# Patient Record
Sex: FEMALE | Race: OTHER | HISPANIC OR LATINO | Employment: FULL TIME | URBAN - METROPOLITAN AREA
[De-identification: names, ages, dates, MRNs, and addresses within clinical notes are randomized per-mention and may not be internally consistent; named-entity substitution may affect disease eponyms.]

---

## 2017-04-12 ENCOUNTER — OFFICE VISIT (OUTPATIENT)
Dept: INTERNAL MEDICINE | Facility: MEDICAL CENTER | Age: 47
End: 2017-04-12
Payer: MEDICAID

## 2017-04-12 VITALS
SYSTOLIC BLOOD PRESSURE: 132 MMHG | HEIGHT: 67 IN | BODY MASS INDEX: 26.84 KG/M2 | HEART RATE: 50 BPM | OXYGEN SATURATION: 98 % | DIASTOLIC BLOOD PRESSURE: 77 MMHG | TEMPERATURE: 98.1 F | WEIGHT: 171 LBS

## 2017-04-12 DIAGNOSIS — F41.9 ANXIETY: ICD-10-CM

## 2017-04-12 DIAGNOSIS — Z72.0 TOBACCO ABUSE: ICD-10-CM

## 2017-04-12 DIAGNOSIS — M79.7 FIBROMYALGIA: ICD-10-CM

## 2017-04-12 DIAGNOSIS — E66.3 OVERWEIGHT (BMI 25.0-29.9): ICD-10-CM

## 2017-04-12 DIAGNOSIS — F33.9 EPISODE OF RECURRENT MAJOR DEPRESSIVE DISORDER, UNSPECIFIED DEPRESSION EPISODE SEVERITY (HCC): ICD-10-CM

## 2017-04-12 DIAGNOSIS — Z23 NEED FOR TDAP VACCINATION: ICD-10-CM

## 2017-04-12 DIAGNOSIS — F31.70 BIPOLAR AFFECTIVE DISORDER IN REMISSION (HCC): ICD-10-CM

## 2017-04-12 DIAGNOSIS — R92.8 ABNORMAL MAMMOGRAM, UNSPECIFIED: ICD-10-CM

## 2017-04-12 DIAGNOSIS — F41.0 PANIC ATTACK: ICD-10-CM

## 2017-04-12 DIAGNOSIS — E28.319 EARLY MENOPAUSE: ICD-10-CM

## 2017-04-12 DIAGNOSIS — M54.42 CHRONIC LEFT-SIDED LOW BACK PAIN WITH LEFT-SIDED SCIATICA: ICD-10-CM

## 2017-04-12 DIAGNOSIS — Z00.00 HEALTHCARE MAINTENANCE: ICD-10-CM

## 2017-04-12 DIAGNOSIS — E55.9 VITAMIN D DEFICIENCY, UNSPECIFIED: ICD-10-CM

## 2017-04-12 DIAGNOSIS — G89.29 OTHER CHRONIC PAIN: ICD-10-CM

## 2017-04-12 DIAGNOSIS — G89.29 CHRONIC LEFT-SIDED LOW BACK PAIN WITH LEFT-SIDED SCIATICA: ICD-10-CM

## 2017-04-12 PROBLEM — F43.10 PTSD (POST-TRAUMATIC STRESS DISORDER): Status: ACTIVE | Noted: 2017-04-12

## 2017-04-12 PROBLEM — M54.40 CHRONIC LEFT-SIDED LOW BACK PAIN WITH SCIATICA: Status: ACTIVE | Noted: 2017-04-12

## 2017-04-12 PROCEDURE — 99204 OFFICE O/P NEW MOD 45 MIN: CPT | Mod: 25,GC | Performed by: INTERNAL MEDICINE

## 2017-04-12 PROCEDURE — 90471 IMMUNIZATION ADMIN: CPT | Performed by: INTERNAL MEDICINE

## 2017-04-12 PROCEDURE — 90715 TDAP VACCINE 7 YRS/> IM: CPT | Performed by: INTERNAL MEDICINE

## 2017-04-12 RX ORDER — CYCLOBENZAPRINE HCL 5 MG
5-10 TABLET ORAL 3 TIMES DAILY PRN
Qty: 30 TAB | Refills: 0 | Status: SHIPPED | OUTPATIENT
Start: 2017-04-12 | End: 2017-05-17 | Stop reason: SDUPTHER

## 2017-04-12 RX ORDER — DULOXETIN HYDROCHLORIDE 30 MG/1
30 CAPSULE, DELAYED RELEASE ORAL DAILY
Qty: 30 CAP | Refills: 5 | Status: SHIPPED | OUTPATIENT
Start: 2017-04-12 | End: 2017-04-12 | Stop reason: SDUPTHER

## 2017-04-12 RX ORDER — DULOXETIN HYDROCHLORIDE 30 MG/1
30 CAPSULE, DELAYED RELEASE ORAL DAILY
Qty: 30 CAP | Refills: 5 | Status: SHIPPED
Start: 2017-04-12 | End: 2017-05-17 | Stop reason: SDUPTHER

## 2017-04-12 ASSESSMENT — PAIN SCALES - GENERAL: PAINLEVEL: 9=SEVERE PAIN

## 2017-04-12 ASSESSMENT — PATIENT HEALTH QUESTIONNAIRE - PHQ9: CLINICAL INTERPRETATION OF PHQ2 SCORE: 3

## 2017-04-12 NOTE — MR AVS SNAPSHOT
"Janina Lantigua   2017 1:30 PM   Office Visit   MRN: [id number]    Department:  [facility] - Internal Med   Dept Phone:  [phone]    Description:  Female : 1970   Provider:  Janis Black M.D.           Reason for Visit     New Patient           Allergies as of 2017     Allergen Noted Reactions    Doxycycline 2017         You were diagnosed with     Fibromyalgia   [925770]       Episode of recurrent major depressive disorder, unspecified depression episode severity (CMS-[facility])   [5157109]       Bipolar affective disorder in remission (CMS-[facility])   [3877291]       Anxiety   [610795]       Panic attack   [206720]       Other chronic pain   [338.29.ICD-9-CM]       Chronic left-sided low back pain with left-sided sciatica   [9698533]       Tobacco abuse   [932739]       Healthcare maintenance   [222845]       Vitamin D deficiency, unspecified   [7608119]       Early menopause   [383335]       Need for Tdap vaccination   [683809]       Abnormal mammogram, unspecified   [793.80.ICD-9-CM]       Overweight (BMI 25.0-29.9)   [962095]         Vital Signs     Blood Pressure Pulse Temperature Height Weight Body Mass Index    132/77 mmHg 50 36.7 °C (98.1 °F) 1.702 m (5' 7\") 77.565 kg (171 lb) 26.78 kg/m2    Oxygen Saturation Last Menstrual Period Breastfeeding? Smoking Status          98% 2008 No Current Every Day Smoker        Basic Information     Date Of Birth Sex Race Ethnicity Preferred Language    1970 Female White Non- English      Your appointments     May 17, 2017  4:00 PM   Established Patient with Janis Black M.D.   [facility] - Internal Medicine (--)    [address] [place] [address]  [address] [place] [address] [id number] [phone]           You will be receiving a confirmation call a few days before your appointment from our automated call confirmation system.              Problem List              ICD-10-CM Priority Class Noted - Resolved    Fibromyalgia M79.7   " 4/12/2017 - Present    Episode of recurrent major depressive disorder (CMS-HCC) F33.9   4/12/2017 - Present    Bipolar affective disorder in remission (CMS-HCC) F31.70   4/12/2017 - Present    Anxiety F41.9   4/12/2017 - Present    Panic attack F41.0   4/12/2017 - Present    Chronic pain G89.29   4/12/2017 - Present    Chronic left-sided low back pain with sciatica M54.40, G89.29   4/12/2017 - Present    Tobacco abuse Z72.0   4/12/2017 - Present    Overweight (BMI 25.0-29.9) E66.3   4/12/2017 - Present      Health Maintenance     Patient has no pending health maintenance at this time      Current Immunizations     Tdap Vaccine 4/12/2017      Below and/or attached are the medications your provider expects you to take. Review all of your home medications and newly ordered medications with your provider and/or pharmacist. Follow medication instructions as directed by your provider and/or pharmacist. Please keep your medication list with you and share with your provider. Update the information when medications are discontinued, doses are changed, or new medications (including over-the-counter products) are added; and carry medication information at all times in the event of emergency situations     Allergies:  DOXYCYCLINE - (reactions not documented)               Medications  Valid as of: April 12, 2017 -  3:14 PM    Generic Name Brand Name Tablet Size Instructions for use    Cyclobenzaprine HCl (Tab) FLEXERIL 5 MG Take 1-2 Tabs by mouth 3 times a day as needed.        DULoxetine HCl (Cap DR Particles) CYMBALTA 30 MG Take 1 Cap by mouth every day.        Nicotine Polacrilex (Gum) NICORETTE 4 MG Chew 1 piece of gum every 1 to 2 hours (maximum: 24 pieces/day); to increase chances of quitting, chew at least 9 pieces/day during the first 6 weeks.        .                 Medicines prescribed today were sent to:     [id number] - [place], [place] - [address]    [address] [place] [address]    Phone:  [phone] Fax: [phone]    Open 24 Hours?: No      Medication refill instructions:       If your prescription bottle indicates you have medication refills left, it is not necessary to call your provider’s office. Please contact your pharmacy and they will refill your medication.    If your prescription bottle indicates you do not have any refills left, you may request refills at any time through one of the following ways: The online [employer] system (except Urgent Care), by calling your provider’s office, or by asking your pharmacy to contact your provider’s office with a refill request. Medication refills are processed only during regular business hours and may not be available until the next business day. Your provider may request additional information or to have a follow-up visit with you prior to refilling your medication.   *Please Note: Medication refills are assigned a new Rx number when refilled electronically. Your pharmacy may indicate that no refills were authorized even though a new prescription for the same medication is available at the pharmacy. Please request the medicine by name with the pharmacy before contacting your provider for a refill.        Your To Do List     Future Labs/Procedures Complete By Expires    CBC WITH DIFFERENTIAL  As directed 4/13/2018    COMP METABOLIC PANEL  As directed 4/13/2018    DS-BONE DENSITY STUDY (DEXA)  As directed 10/13/2017    LIPID PROFILE  As directed 4/13/2018    MA-SCREEN MAMMO W/CAD-BILAT  As directed 5/14/2018    TSH WITH REFLEX TO FT4  As directed 4/12/2018    VITAMIN D,25 HYDROXY  As directed 4/13/2018      Referral     A referral request has been sent to our patient care coordination department. Please allow 3-5 business days for us to process this request and contact you either by phone or mail. If you do not hear from us by the 5th business day, please call us at [phone].        Instructions    - see psychiatrist.  - get blood tests done.  -  get mammogram done.  - get bone scan done.   - next appointment in 5 weeks for pap smear.   - link for [facility] medical marijuana   [email or web address]          MyChart Status: Patient Declined        Quit Tobacco Information     Do you want to quit using tobacco?    Quitting tobacco decreases risks of cancer, heart and lung disease, increases life expectancy, improves sense of taste and smell, and increases spending money, among other benefits.    If you are thinking about quitting, we can help.  • [facility] Quit Tobacco Program: [phone]  o Program occurs weekly for four weeks and includes pharmacist consultation on products to support quitting smoking or chewing tobacco. A provider referral is needed for pharmacist consultation.  • Tobacco Users Help Hotline: 7-325-QUITNOW (515-9174) or https://[place].quitlogix.org/  o Free, confidential telephone and online coaching for [place] residents. Sessions are designed on a schedule that is convenient for you. Eligible clients receive free nicotine replacement therapy.  • Nationally: www.smokefree.gov  o Information and professional assistance to support both immediate and long-term needs as you become, and remain, a non-smoker. Smokefree.gov allows you to choose the help that best fits your needs.

## 2017-04-12 NOTE — PATIENT INSTRUCTIONS
- see psychiatrist.  - get blood tests done.  - get mammogram done.  - get bone scan done.   - next appointment in 5 weeks for pap smear.   - link for [facility] medical marijuana   [email or web address]

## 2017-04-13 ENCOUNTER — TELEPHONE (OUTPATIENT)
Dept: INTERNAL MEDICINE | Facility: MEDICAL CENTER | Age: 47
End: 2017-04-13

## 2017-04-13 NOTE — PROGRESS NOTES
New Patient to Establish    Reason to establish: new patient to establish    CC: medication refill, depression    HPI: Janina is a 46 year old female with history of fibromyalgia, major depression, anxiety, panic attack, anxiety disorder. She visited our office today to establish primary care and medication refill. Patient moved to [place] from [place] about 10 months ago. She states has been out of medications for more than 9 months now. She has been taking Lyrica, Cymbalta, Flexeril and Ibuprofen. Patient states she does not like Lyrica which made her sick and does not help much with the pain. Her pain is mostly in the lower back with left sided sciatica and muscle ache / pain throughout. Denies joint inflammation.     She reports she has been feeling depress & fatigue and her memory was not good as before. But she denies suicidal or homicidal ideation. She states she would never have such kind of thoughts as she has 3 daughters, the youngest one is 9 and all 3 of her daughters need her.     She reports has not had a period since she has her tube ties after she gave birth her last kid about 9 years ago. She noticed hot flushing recently, denies night sweats, labile emotions. Not sexually active currently.    She usually smokes 1/2 PPD for the past 30 years but has not been smoking for a week now because of the delivery of the grand child. She is requesting for nicotine gum today.         Patient Active Problem List    Diagnosis Date Noted   • Fibromyalgia 04/12/2017   • Episode of recurrent major depressive disorder (CMS-HCC) 04/12/2017   • Bipolar affective disorder in remission (CMS-HCC) 04/12/2017   • Anxiety 04/12/2017   • Panic attack 04/12/2017   • Chronic pain 04/12/2017   • Chronic left-sided low back pain with sciatica 04/12/2017   • Tobacco abuse 04/12/2017   • Overweight (BMI 25.0-29.9) 04/12/2017       Past Medical History   Diagnosis Date   • Psychiatric disorder      bipolar   • Psychiatric disorder       depression   • Fibromyalgia    • Major depression    • Asthma    • Bipolar 2 disorder, major depressive episode (CMS-[facility])    • PTSD (post-traumatic stress disorder)    • Lumbago        Current Outpatient Prescriptions   Medication Sig Dispense Refill   • cyclobenzaprine (FLEXERIL) 5 MG tablet Take 1-2 Tabs by mouth 3 times a day as needed. 30 Tab 0   • duloxetine (CYMBALTA) 30 MG Cap DR Particles Take 1 Cap by mouth every day. 30 Cap 5   • nicotine polacrilex (NICORETTE) 4 MG gum Chew 1 piece of gum every 1 to 2 hours (maximum: 24 pieces/day); to increase chances of quitting, chew at least 9 pieces/day during the first 6 weeks. 90 Each 5     No current facility-administered medications for this visit.       Allergies as of 2017 - Shahid as Reviewed 2017   Allergen Reaction Noted   • Doxycycline  2017       Social History     Social History   • Marital Status: Single     Spouse Name: N/A   • Number of Children: N/A   • Years of Education: N/A     Occupational History   • Not on file.     Social History Main Topics   • Smoking status: Current Every Day Smoker -- 0.50 packs/day for 30 years     Types: Cigarettes   • Smokeless tobacco: Never Used   • Alcohol Use: Yes      Comment: 2-3 times a month    • Drug Use: No   • Sexual Activity:     Partners: Male     Other Topics Concern   • Not on file     Social History Narrative       Family History   Problem Relation Age of Onset   • Cancer Mother 43     melanoma,  from cancer spread at age 48.    • Bipolar disorder Father    • Alzheimer's Disease Maternal Grandmother    • Cancer Paternal Grandmother      breast cancer   • Cancer Paternal Grandfather      lung cancer       Past Surgical History   Procedure Laterality Date   • Primary c section       3 times   • Other       ovarian cyst removed by laser surgery       ROS: As per HPI. Additional pertinent symptoms as noted below:     Patient denies chest pain, dyspnea, orthopnea, PND, edema,  "cough, fever, chills, nausea / vomiting, abdominal pain, dysuria, changes in appetite or weight, diarrhea / constipation, headache, tingling / numbness sensation, weakness, visual changes.    /77 mmHg  Pulse 50  Temp(Src) 36.7 °C (98.1 °F)  Ht 1.702 m (5' 7\")  Wt 77.565 kg (171 lb)  BMI 26.78 kg/m2  SpO2 98%  LMP 04/12/2008  Breastfeeding? No    Physical Exam  General:  Alert and oriented, No apparent distress.    Eyes: Pupils equal and reactive. No scleral icterus.    Throat: Clear no erythema or exudates noted.    Neck: Supple. No lymphadenopathy noted. Thyroid not enlarged.    Lungs: Clear to auscultation and percussion bilaterally.    Cardiovascular: Regular rate and rhythm. No murmurs, rubs or gallops.    Abdomen:  Benign. No rebound or guarding noted.    Extremities: No clubbing, cyanosis, edema.    Skin: Clear. No rash or suspicious skin lesions noted.        Assessment and Plan    1. Fibromyalgia      Other chronic pain  - previously followed rheumatologist in [place].   - was on Lyrica, Cymbalta, Flexeril & Ibuprofen.  - has been run out of meds x over 9 months since she moved to [place], has only been taking OTC Ibuprofen.   - reports generalized muscle pain.   PLAN:  -     duloxetine (CYMBALTA) 30 MG Cap DR Particles; Take 1 Cap by mouth every day.  -     TSH WITH REFLEX TO FT4; Future        2. Major depressive disorder      Bipolar affective disorder in remission (CMS-HCC)      Anxiety disorder      Panic attack  - previously followed psych in [place].   - was on Cymbalta.  - reports fatigue, poor concentration, etc. pHQ 9 score 19 - moderate depression.  - denies SI / HI.  PLAN:  -     duloxetine (CYMBALTA) 30 MG Cap DR Particles; Take 1 Cap by mouth every day.        -     REFERRAL TO PSYCHIATRY        3. Chronic left-sided low back pain with left-sided sciatica  - likely secondary to DJD of lumbar.   - + straight leg raising test.   PLAN:   -     duloxetine (CYMBALTA) 30 MG Cap DR " Particles; Take 1 Cap by mouth every day.        -     cyclobenzaprine (FLEXERIL) 5 MG tablet; Take 1-2 Tabs by mouth 3 times a day as needed.        4. Tobacco abuse  - active smoker, reports has not been smoked for a week and request nicotine gum.   PLAN:        -     nicotine polacrilex (NICORETTE) 4 MG gum; Chew 1 piece of gum every 1 to 2 hours (maximum: 24 pieces/day); to increase chances of quitting, chew at least 9 pieces/day during the first 6 weeks.         -     counseled pt on smoking cessation.         5. Vitamin D deficiency, unspecified  -     VITAMIN D,25 HYDROXY; Future        6. Early menopause  - LMP 9 yrs ago since her tube was tied.   - reports flushing symptoms recently.   - she is at risk for osteoporosis given early onset of menopause at age ~ 37 years.   - will screen for osteoporosis with bone scan.   PLAN:  -     DS-BONE DENSITY STUDY (DEXA); Future        7. Abnormal mammogram, unspecified  - pt reports had mammogram in 1/2016 and was told to repeat in a year.   - family hx of breast in 2nd degree relative, paternal grand mother.   - will order mammogram for now.   -     MA-SCREEN MAMMO W/CAD-BILAT; Future        8.Overweight (BMI 25.0-29.9)  - BMI 26.78.  - encouraged life style modification to lost weight.         9. Healthcare maintenance  - will get basic labs.   -     CBC WITH DIFFERENTIAL; Future  -     COMP METABOLIC PANEL; Future  -     LIPID PROFILE; Future  -     TSH WITH REFLEX TO FT4; Future        10. Preventive care  Flu - pt reports up to date.   Tdap - administered in office today.   Pap - next visit.  Mammogram - ordered.  Dexa - ordered.         Followup: Return in about 5 weeks (around 5/17/2017).    Risk Assessment (discuss potential complications a function of chronic problems): discussed with patient.     Complexity (discuss number of co-morbidities): level 5.     Signed by: Janis Black M.D.

## 2017-05-09 ENCOUNTER — HOSPITAL ENCOUNTER (OUTPATIENT)
Dept: LAB | Facility: MEDICAL CENTER | Age: 47
End: 2017-05-09
Attending: STUDENT IN AN ORGANIZED HEALTH CARE EDUCATION/TRAINING PROGRAM
Payer: MEDICAID

## 2017-05-09 DIAGNOSIS — M79.7 FIBROMYALGIA: ICD-10-CM

## 2017-05-09 DIAGNOSIS — F33.9 EPISODE OF RECURRENT MAJOR DEPRESSIVE DISORDER, UNSPECIFIED DEPRESSION EPISODE SEVERITY (HCC): ICD-10-CM

## 2017-05-09 DIAGNOSIS — E55.9 VITAMIN D DEFICIENCY: ICD-10-CM

## 2017-05-09 DIAGNOSIS — Z00.00 HEALTHCARE MAINTENANCE: ICD-10-CM

## 2017-05-09 LAB
25(OH)D3 SERPL-MCNC: 22 NG/ML (ref 30–100)
ALBUMIN SERPL BCP-MCNC: 4.1 G/DL (ref 3.2–4.9)
ALBUMIN/GLOB SERPL: 1.2 G/DL
ALP SERPL-CCNC: 90 U/L (ref 30–99)
ALT SERPL-CCNC: 9 U/L (ref 2–50)
ANION GAP SERPL CALC-SCNC: 5 MMOL/L (ref 0–11.9)
AST SERPL-CCNC: 12 U/L (ref 12–45)
BASOPHILS # BLD AUTO: 1 % (ref 0–1.8)
BASOPHILS # BLD: 0.07 K/UL (ref 0–0.12)
BILIRUB SERPL-MCNC: 0.5 MG/DL (ref 0.1–1.5)
BUN SERPL-MCNC: 18 MG/DL (ref 8–22)
CALCIUM SERPL-MCNC: 9.4 MG/DL (ref 8.5–10.5)
CHLORIDE SERPL-SCNC: 105 MMOL/L (ref 96–112)
CHOLEST SERPL-MCNC: 158 MG/DL (ref 100–199)
CO2 SERPL-SCNC: 29 MMOL/L (ref 20–33)
CREAT SERPL-MCNC: 0.87 MG/DL (ref 0.5–1.4)
EOSINOPHIL # BLD AUTO: 0.58 K/UL (ref 0–0.51)
EOSINOPHIL NFR BLD: 8.3 % (ref 0–6.9)
ERYTHROCYTE [DISTWIDTH] IN BLOOD BY AUTOMATED COUNT: 45.4 FL (ref 35.9–50)
GFR SERPL CREATININE-BSD FRML MDRD: >60 ML/MIN/1.73 M 2
GLOBULIN SER CALC-MCNC: 3.4 G/DL (ref 1.9–3.5)
GLUCOSE SERPL-MCNC: 93 MG/DL (ref 65–99)
HCT VFR BLD AUTO: 43.8 % (ref 37–47)
HDLC SERPL-MCNC: 48 MG/DL
HGB BLD-MCNC: 14 G/DL (ref 12–16)
IMM GRANULOCYTES # BLD AUTO: 0.02 K/UL (ref 0–0.11)
IMM GRANULOCYTES NFR BLD AUTO: 0.3 % (ref 0–0.9)
LDLC SERPL CALC-MCNC: 93 MG/DL
LYMPHOCYTES # BLD AUTO: 2.12 K/UL (ref 1–4.8)
LYMPHOCYTES NFR BLD: 30.4 % (ref 22–41)
MCH RBC QN AUTO: 30.4 PG (ref 27–33)
MCHC RBC AUTO-ENTMCNC: 32 G/DL (ref 33.6–35)
MCV RBC AUTO: 95 FL (ref 81.4–97.8)
MONOCYTES # BLD AUTO: 0.61 K/UL (ref 0–0.85)
MONOCYTES NFR BLD AUTO: 8.7 % (ref 0–13.4)
NEUTROPHILS # BLD AUTO: 3.58 K/UL (ref 2–7.15)
NEUTROPHILS NFR BLD: 51.3 % (ref 44–72)
NRBC # BLD AUTO: 0 K/UL
NRBC BLD AUTO-RTO: 0 /100 WBC
PLATELET # BLD AUTO: 349 K/UL (ref 164–446)
PMV BLD AUTO: 9.1 FL (ref 9–12.9)
POTASSIUM SERPL-SCNC: 4.1 MMOL/L (ref 3.6–5.5)
PROT SERPL-MCNC: 7.5 G/DL (ref 6–8.2)
RBC # BLD AUTO: 4.61 M/UL (ref 4.2–5.4)
SODIUM SERPL-SCNC: 139 MMOL/L (ref 135–145)
TRIGL SERPL-MCNC: 86 MG/DL (ref 0–149)
TSH SERPL DL<=0.005 MIU/L-ACNC: 0.97 UIU/ML (ref 0.3–3.7)
WBC # BLD AUTO: 7 K/UL (ref 4.8–10.8)

## 2017-05-09 PROCEDURE — 80061 LIPID PANEL: CPT

## 2017-05-09 PROCEDURE — 36415 COLL VENOUS BLD VENIPUNCTURE: CPT

## 2017-05-09 PROCEDURE — 82306 VITAMIN D 25 HYDROXY: CPT

## 2017-05-09 PROCEDURE — 80053 COMPREHEN METABOLIC PANEL: CPT

## 2017-05-09 PROCEDURE — 85025 COMPLETE CBC W/AUTO DIFF WBC: CPT

## 2017-05-09 PROCEDURE — 84443 ASSAY THYROID STIM HORMONE: CPT

## 2017-05-11 ENCOUNTER — TELEPHONE (OUTPATIENT)
Dept: INTERNAL MEDICINE | Facility: MEDICAL CENTER | Age: 47
End: 2017-05-11

## 2017-05-11 NOTE — Clinical Note
May 12, 2017        Janina Lantigua  [address] [facility] [address]        Dear Dr. Sofia Roa has reviewed your lab test(s) collected on 05/09/17. Results are as follows:  Recent blood tests are all within normal except for slightly low vitamin D. You  should take over the counter vitamin D supplementation: vit D 1000 IU or 2000 IU daily. Thanks !                     If you have any questions or concerns, please don't hesitate to call.        Sincerely,        Janis Black M.D.  Jamila Jessica MA    Electronically Signed

## 2017-05-11 NOTE — TELEPHONE ENCOUNTER
Please call patient and let her know that her recent blood tests are all within normal except for slightly low vitamin D. She should take over the counter vitamin D supplementation: vit D 1000 IU or 2000 IU daily. Thanks !

## 2017-05-17 ENCOUNTER — OFFICE VISIT (OUTPATIENT)
Dept: INTERNAL MEDICINE | Facility: MEDICAL CENTER | Age: 47
End: 2017-05-17
Payer: MEDICAID

## 2017-05-17 VITALS
WEIGHT: 168 LBS | SYSTOLIC BLOOD PRESSURE: 112 MMHG | OXYGEN SATURATION: 95 % | HEART RATE: 67 BPM | DIASTOLIC BLOOD PRESSURE: 75 MMHG | TEMPERATURE: 98.1 F | HEIGHT: 67 IN | BODY MASS INDEX: 26.37 KG/M2

## 2017-05-17 DIAGNOSIS — G89.29 CHRONIC LEFT-SIDED LOW BACK PAIN WITH LEFT-SIDED SCIATICA: ICD-10-CM

## 2017-05-17 DIAGNOSIS — G56.02 CARPAL TUNNEL SYNDROME OF LEFT WRIST: ICD-10-CM

## 2017-05-17 DIAGNOSIS — E66.3 OVERWEIGHT (BMI 25.0-29.9): ICD-10-CM

## 2017-05-17 DIAGNOSIS — Z72.0 TOBACCO ABUSE: ICD-10-CM

## 2017-05-17 DIAGNOSIS — M25.562 CHRONIC PAIN OF LEFT KNEE: ICD-10-CM

## 2017-05-17 DIAGNOSIS — M54.42 CHRONIC LEFT-SIDED LOW BACK PAIN WITH LEFT-SIDED SCIATICA: ICD-10-CM

## 2017-05-17 DIAGNOSIS — F41.0 PANIC ATTACK: ICD-10-CM

## 2017-05-17 DIAGNOSIS — M79.7 FIBROMYALGIA: ICD-10-CM

## 2017-05-17 DIAGNOSIS — F31.70 BIPOLAR AFFECTIVE DISORDER IN REMISSION (HCC): ICD-10-CM

## 2017-05-17 DIAGNOSIS — F33.9 EPISODE OF RECURRENT MAJOR DEPRESSIVE DISORDER, UNSPECIFIED DEPRESSION EPISODE SEVERITY (HCC): ICD-10-CM

## 2017-05-17 DIAGNOSIS — M25.552 LEFT HIP PAIN: ICD-10-CM

## 2017-05-17 DIAGNOSIS — F41.9 ANXIETY: ICD-10-CM

## 2017-05-17 DIAGNOSIS — G89.29 CHRONIC PAIN OF LEFT KNEE: ICD-10-CM

## 2017-05-17 DIAGNOSIS — G89.29 OTHER CHRONIC PAIN: ICD-10-CM

## 2017-05-17 PROCEDURE — 99214 OFFICE O/P EST MOD 30 MIN: CPT | Mod: GC | Performed by: INTERNAL MEDICINE

## 2017-05-17 RX ORDER — DULOXETIN HYDROCHLORIDE 30 MG/1
30 CAPSULE, DELAYED RELEASE ORAL DAILY
Qty: 30 CAP | Refills: 5 | Status: SHIPPED | OUTPATIENT
Start: 2017-05-17 | End: 2019-08-18

## 2017-05-17 RX ORDER — CYCLOBENZAPRINE HCL 5 MG
5-10 TABLET ORAL 3 TIMES DAILY PRN
Qty: 30 TAB | Refills: 3 | Status: SHIPPED | OUTPATIENT
Start: 2017-05-17 | End: 2019-08-18

## 2017-05-17 ASSESSMENT — PAIN SCALES - GENERAL: PAINLEVEL: 6=MODERATE PAIN

## 2017-05-17 NOTE — PATIENT INSTRUCTIONS
- get your imaging done.   - next appointment in 5 weeks with Dr. Black for pap smear and women wellness examination.

## 2017-05-17 NOTE — PROGRESS NOTES
Established Patient    Janina presents today with the following:    CC: follow up; back pain; left sided hip pain; left knee pain     HPI: Janina is a 46 year old female with history of fibromyalgia, major depression, anxiety, panic attack, anxiety disorder. She visited our office today for above cc. Patient reports that lately, her back pain, left hip pain and left knee pain has been bothering her much. Cymbalta & Flexeril helps with the pain somewhat degree but she is still hurting most of the time. During her last office visit, she was interested in medical marijuana. Today, she states that she can not afford the medical marijuana application fees $25, as she has no income.     She also reports numbness of left hand ring finger & little finger. She takes care of new born grand-daughter at home and has been carrying her most of the time.     She has a recent routine blood tests with normal results except for mildly reduced vitamin d level.     She reports she has recently stopped smoking.     Patient Active Problem List    Diagnosis Date Noted   • Carpal tunnel syndrome of left wrist 05/17/2017   • Fibromyalgia 04/12/2017   • Episode of recurrent major depressive disorder (CMS-HCC) 04/12/2017   • Bipolar affective disorder in remission (CMS-HCC) 04/12/2017   • Anxiety 04/12/2017   • Panic attack 04/12/2017   • Chronic pain 04/12/2017   • Chronic left-sided low back pain with sciatica 04/12/2017   • Tobacco abuse 04/12/2017   • Overweight (BMI 25.0-29.9) 04/12/2017       Current Outpatient Prescriptions   Medication Sig Dispense Refill   • cyclobenzaprine (FLEXERIL) 5 MG tablet Take 1-2 Tabs by mouth 3 times a day as needed. 30 Tab 3   • duloxetine (CYMBALTA) 30 MG Cap DR Particles Take 1 Cap by mouth every day. 30 Cap 5   • nicotine polacrilex (NICORETTE) 4 MG gum Chew 1 piece of gum every 1 to 2 hours (maximum: 24 pieces/day); to increase chances of quitting, chew at least 9 pieces/day during the first 6 weeks.  "90 Each 5     No current facility-administered medications for this visit.       Social History     Social History   • Marital Status: Single     Spouse Name: N/A   • Number of Children: N/A   • Years of Education: N/A     Occupational History   • Not on file.     Social History Main Topics   • Smoking status: Former Smoker -- 0.50 packs/day for 30 years     Types: Cigarettes   • Smokeless tobacco: Never Used   • Alcohol Use: Yes      Comment: 2-3 times a month    • Drug Use: No   • Sexual Activity:     Partners: Male     Other Topics Concern   • Not on file     Social History Narrative       Family History   Problem Relation Age of Onset   • Cancer Mother 43     melanoma,  from cancer spread at age 48.    • Bipolar disorder Father    • Alzheimer's Disease Maternal Grandmother    • Cancer Paternal Grandmother      breast cancer   • Cancer Paternal Grandfather      lung cancer       ROS: As per HPI. Additional pertinent symptoms as noted below:    Patient denies chest pain, dyspnea, orthopnea, PND, edema, cough, fever, chills, nausea / vomiting, abdominal pain, dysuria, changes in appetite or weight, diarrhea / constipation, headache, tingling / numbness sensation, weakness, visual changes.    /75 mmHg  Pulse 67  Temp(Src) 36.7 °C (98.1 °F)  Ht 1.702 m (5' 7.01\")  Wt 76.204 kg (168 lb)  BMI 26.31 kg/m2  SpO2 95%  Breastfeeding? No    Physical Exam  General:  Alert and oriented, No apparent distress.    Eyes: Pupils equal and reactive. No scleral icterus.    Throat: Clear no erythema or exudates noted.    Neck: Supple. No lymphadenopathy noted. Thyroid not enlarged.    Lungs: Clear to auscultation and percussion bilaterally.    Cardiovascular: Regular rate and rhythm. No murmurs, rubs or gallops.    Abdomen:  Benign. No rebound or guarding noted.    Extremities: No clubbing, cyanosis, edema.    Skin: Clear. No rash or suspicious skin lesions noted.    Musculoskeletal: tender to touch over lumbar " vertebrae & paraspinal muscles, but no spasm of muscle noted. + left sided straight leg raising test.                               Tender point + over left lateral hip.                               Limited ROM of left knee & lumbar because of pain.       Assessment and Plan    1. Chronic left-sided low back pain with left-sided sciatica      Left hip pain      Chronic pain of left knee  - secondary to ? DJD vs fibromyalgia.  - will get lumbar spine XR, pelvic XR including B/L hip, knee XR.   - encourage to use heat pad.   - continue cymbalta & flexeril.      Cymbalta 30 mg qd.      Flexeril 5 mg, 1-2 tabs tid prn.   - encourage weight loss by life style modification.         2. Carpal tunnel syndrome of left wrist  - positive Phalen's maneuver.   - courage to wear wrist splint.         3. Fibromyalgia      Other chronic pain  - previously followed rheumatologist in [place].   - was on Lyrica, Cymbalta, Flexeril & Ibuprofen.   - off meds x over 9 months since she moved to [place] in 2016, Till she visited our office in  April 2017, she started taking Cymbalta.  - now on Cymbalta.  - continue Cymbalta 30 mg qd.         4. Major depressive disorder       Bipolar affective disorder in remission (CMS-HCC)       Anxiety disorder       Panic attack  - previously followed psych in  [place].   - was on Cymbalta.   - reports fatigue, poor concentration, etc. pHQ 9 score 19 - moderate depression.   - denies SI / HI.   - continues Cymbalta.  - pending psych eval, she has appointment in 5/30/17.         5. Overweight (BMI 25.0-29.9)  - BMI 26.31.  - encourage life style modification and regular exercise.         6. Tobacco abuse  - pt states has stopped smoking a few weeks ago.  - does not like nicotine gum.         7. Vitamin D deficiency  - 25 OH vit D 22 in 5/9/2017.   - OTC vit D supplements.         8. Preventive care  Flu - completed   TDaP - last one in 4/2017.   Pap - next visit   Mammogram - has appointment in  6/9/2017.   Dexa - has appointment in 6/9/2017.       Followup: Return in about 5 weeks (around 6/21/2017).     Signed by: Janis Black M.D.

## 2017-05-17 NOTE — MR AVS SNAPSHOT
"        Janina Lantigua   2017 4:00 PM   Office Visit   MRN: [id number]    Department:  [facility]   Dept Phone:  [phone]    Description:  Female : 1970   Provider:  Janis Black M.D.           Reason for Visit     Follow-Up           Allergies as of 2017     Allergen Noted Reactions    Doxycycline 2017         You were diagnosed with     Chronic left-sided low back pain with left-sided sciatica   [6137787]       Fibromyalgia   [382213]       Other chronic pain   [338.29.ICD-9-CM]       Left hip pain   [868710]       Tobacco abuse   [255942]       Panic attack   [722649]       Overweight (BMI 25.0-29.9)   [443275]       Episode of recurrent major depressive disorder, unspecified depression episode severity (CMS-HCC)   [2523744]       Anxiety   [826298]       Bipolar affective disorder in remission (CMS-[facility])   [5194157]       Chronic pain of left knee   [541542]         Vital Signs     Blood Pressure Pulse Temperature Height Weight Body Mass Index    112/75 mmHg 67 36.7 °C (98.1 °F) 1.702 m (5' 7.01\") 76.204 kg (168 lb) 26.31 kg/m2    Oxygen Saturation Breastfeeding? Smoking Status             95% No Former Smoker         Basic Information     Date Of Birth Sex Race Ethnicity Preferred Language    1970 Female White Non- English      Your appointments     2017  1:00 PM   MA SCRN10 with RB[facility] MG 2   [facility] [place], [facility] ([address])    [facility] [address] [address] [phone]           No deodorant, powder, perfume or lotion under the arm or breast area.            2017  1:30 PM   BONE DENSITY (DEXASCAN) with RBHC BD 1   [facility] [place], [facility] ([address])    [address] [facility] [address]  [facility] [id number] [phone]           No calcium supplements 24 hours prior to exam, including antacids or multivitamins w/calcium.  Pt may eat and drink normally.  No injection procedures prior to Dexa on the same " day.  No barium contrast, no CTs with IV or oral contrast, no Pet/CTs and no Nuc Med injections for 7 days prior to a Dexa (including Barium Swallow, Upper GI, Small Bowel Series).  If scheduling a Dexa on the same day as a CT with IV or oral contrast, any test with barium, Pet/CT or a Nuc Med with injection, always schedule the Dexa before the other study.            Jun 21, 2017  2:15 PM   Established Patient with Janis Black M.D.   [facility] - Internal Medicine (--)    [address] [place] [address]  [address]  [facility] [address] [phone]           You will be receiving a confirmation call a few days before your appointment from our automated call confirmation system.              Problem List              ICD-10-CM Priority Class Noted - Resolved    Fibromyalgia M79.7   4/12/2017 - Present    Episode of recurrent major depressive disorder (CMS-HCC) F33.9   4/12/2017 - Present    Bipolar affective disorder in remission (CMS-[facility]) F31.70   4/12/2017 - Present    Anxiety F41.9   4/12/2017 - Present    Panic attack F41.0   4/12/2017 - Present    Chronic pain G89.29   4/12/2017 - Present    Chronic left-sided low back pain with sciatica M54.40, G89.29   4/12/2017 - Present    Tobacco abuse Z72.0   4/12/2017 - Present    Overweight (BMI 25.0-29.9) E66.3   4/12/2017 - Present      Health Maintenance        Date Due Completion Dates    IMM PNEUMOCOCCAL 19-64 (ADULT) MEDIUM RISK SERIES (1 of 1 - PPSV23) 10/19/1991 ---    PAP SMEAR 10/19/1993 ---    MAMMOGRAM 10/19/2012 ---    IMM DTaP/Tdap/Td Vaccine (2 - Td) 4/12/2027 4/12/2017            Current Immunizations     Tdap Vaccine 4/12/2017      Below and/or attached are the medications your provider expects you to take. Review all of your home medications and newly ordered medications with your provider and/or pharmacist. Follow medication instructions as directed by your provider and/or pharmacist. Please keep your medication list with you and share with your  provider. Update the information when medications are discontinued, doses are changed, or new medications (including over-the-counter products) are added; and carry medication information at all times in the event of emergency situations     Allergies:  DOXYCYCLINE - (reactions not documented)               Medications  Valid as of: May 17, 2017 -  5:00 PM    Generic Name Brand Name Tablet Size Instructions for use    Cyclobenzaprine HCl (Tab) FLEXERIL 5 MG Take 1-2 Tabs by mouth 3 times a day as needed.        DULoxetine HCl (Cap DR Particles) CYMBALTA 30 MG Take 1 Cap by mouth every day.        Nicotine Polacrilex (Gum) NICORETTE 4 MG Chew 1 piece of gum every 1 to 2 hours (maximum: 24 pieces/day); to increase chances of quitting, chew at least 9 pieces/day during the first 6 weeks.        .                 Medicines prescribed today were sent to:     [id number] - [place], [place] - [address]    [address] [place] [address]    Phone: [phone] Fax: [phone]    Open 24 Hours?: No      Medication refill instructions:       If your prescription bottle indicates you have medication refills left, it is not necessary to call your provider’s office. Please contact your pharmacy and they will refill your medication.    If your prescription bottle indicates you do not have any refills left, you may request refills at any time through one of the following ways: The online [facility] system (except Urgent Care), by calling your provider’s office, or by asking your pharmacy to contact your provider’s office with a refill request. Medication refills are processed only during regular business hours and may not be available until the next business day. Your provider may request additional information or to have a follow-up visit with you prior to refilling your medication.   *Please Note: Medication refills are assigned a new Rx number when refilled electronically. Your pharmacy may indicate that no refills  were authorized even though a new prescription for the same medication is available at the pharmacy. Please request the medicine by name with the pharmacy before contacting your provider for a refill.        Your To Do List     Future Labs/Procedures Complete By Expires    DX-HIP-BILATERAL-WITH PELVIS-3/4 VIEWS  As directed 5/17/2018    DX-KNEE 2- LEFT  As directed 5/17/2018    DX-LUMBAR SPINE-2 OR 3 VIEWS  As directed 5/17/2018      Instructions    - get your imaging done.   - next appointment in 5 weeks with Dr. Black for pap smear and women wellness examination.             MyChart Status: Patient Declined

## 2017-06-09 ENCOUNTER — HOSPITAL ENCOUNTER (OUTPATIENT)
Dept: RADIOLOGY | Facility: MEDICAL CENTER | Age: 47
End: 2017-06-09
Attending: STUDENT IN AN ORGANIZED HEALTH CARE EDUCATION/TRAINING PROGRAM
Payer: MEDICAID

## 2017-06-09 ENCOUNTER — TELEPHONE (OUTPATIENT)
Dept: HOSPITALIST | Facility: MEDICAL CENTER | Age: 47
End: 2017-06-09

## 2017-06-09 DIAGNOSIS — E28.319 EARLY MENOPAUSE: ICD-10-CM

## 2017-06-09 DIAGNOSIS — R92.8 ABNORMAL MAMMOGRAM, UNSPECIFIED: ICD-10-CM

## 2017-06-09 PROCEDURE — G0202 SCR MAMMO BI INCL CAD: HCPCS

## 2017-06-09 PROCEDURE — 77080 DXA BONE DENSITY AXIAL: CPT

## 2017-06-20 ENCOUNTER — HOSPITAL ENCOUNTER (OUTPATIENT)
Dept: RADIOLOGY | Facility: MEDICAL CENTER | Age: 47
End: 2017-06-20

## 2017-06-21 ENCOUNTER — APPOINTMENT (OUTPATIENT)
Dept: INTERNAL MEDICINE | Facility: MEDICAL CENTER | Age: 47
End: 2017-06-21
Payer: MEDICAID

## 2017-09-25 ENCOUNTER — APPOINTMENT (OUTPATIENT)
Dept: INTERNAL MEDICINE | Facility: MEDICAL CENTER | Age: 47
End: 2017-09-25
Payer: MEDICAID

## 2019-08-18 ENCOUNTER — OFFICE VISIT (OUTPATIENT)
Dept: URGENT CARE | Facility: CLINIC | Age: 49
End: 2019-08-18
Payer: MEDICAID

## 2019-08-18 ENCOUNTER — HOSPITAL ENCOUNTER (EMERGENCY)
Facility: MEDICAL CENTER | Age: 49
End: 2019-08-18
Attending: EMERGENCY MEDICINE
Payer: MEDICAID

## 2019-08-18 VITALS
BODY MASS INDEX: 22.46 KG/M2 | OXYGEN SATURATION: 94 % | WEIGHT: 147.71 LBS | TEMPERATURE: 96.8 F | RESPIRATION RATE: 18 BRPM | DIASTOLIC BLOOD PRESSURE: 67 MMHG | HEART RATE: 50 BPM | SYSTOLIC BLOOD PRESSURE: 111 MMHG

## 2019-08-18 VITALS
WEIGHT: 147.6 LBS | OXYGEN SATURATION: 97 % | SYSTOLIC BLOOD PRESSURE: 102 MMHG | BODY MASS INDEX: 22.37 KG/M2 | HEIGHT: 68 IN | DIASTOLIC BLOOD PRESSURE: 70 MMHG | TEMPERATURE: 98.4 F | RESPIRATION RATE: 12 BRPM | HEART RATE: 71 BPM

## 2019-08-18 DIAGNOSIS — N12 PYELONEPHRITIS: ICD-10-CM

## 2019-08-18 DIAGNOSIS — R10.32 ACUTE LEFT LOWER QUADRANT PAIN: ICD-10-CM

## 2019-08-18 LAB
ALBUMIN SERPL BCP-MCNC: 4.3 G/DL (ref 3.2–4.9)
ALBUMIN/GLOB SERPL: 1.3 G/DL
ALP SERPL-CCNC: 61 U/L (ref 30–99)
ALT SERPL-CCNC: 10 U/L (ref 2–50)
ANION GAP SERPL CALC-SCNC: 6 MMOL/L (ref 0–11.9)
APPEARANCE UR: ABNORMAL
AST SERPL-CCNC: 17 U/L (ref 12–45)
BACTERIA #/AREA URNS HPF: ABNORMAL /HPF
BASOPHILS # BLD AUTO: 0.7 % (ref 0–1.8)
BASOPHILS # BLD: 0.05 K/UL (ref 0–0.12)
BILIRUB SERPL-MCNC: 0.5 MG/DL (ref 0.1–1.5)
BILIRUB UR QL STRIP.AUTO: NEGATIVE
BUN SERPL-MCNC: 13 MG/DL (ref 8–22)
CALCIUM SERPL-MCNC: 9.6 MG/DL (ref 8.5–10.5)
CHLORIDE SERPL-SCNC: 106 MMOL/L (ref 96–112)
CO2 SERPL-SCNC: 27 MMOL/L (ref 20–33)
COLOR UR: YELLOW
CREAT SERPL-MCNC: 0.92 MG/DL (ref 0.5–1.4)
EOSINOPHIL # BLD AUTO: 0.25 K/UL (ref 0–0.51)
EOSINOPHIL NFR BLD: 3.3 % (ref 0–6.9)
EPI CELLS #/AREA URNS HPF: ABNORMAL /HPF
ERYTHROCYTE [DISTWIDTH] IN BLOOD BY AUTOMATED COUNT: 46.3 FL (ref 35.9–50)
GLOBULIN SER CALC-MCNC: 3.2 G/DL (ref 1.9–3.5)
GLUCOSE SERPL-MCNC: 95 MG/DL (ref 65–99)
GLUCOSE UR STRIP.AUTO-MCNC: NEGATIVE MG/DL
HCG SERPL QL: NEGATIVE
HCG UR QL: NEGATIVE
HCT VFR BLD AUTO: 44.5 % (ref 37–47)
HGB BLD-MCNC: 14.3 G/DL (ref 12–16)
IMM GRANULOCYTES # BLD AUTO: 0.06 K/UL (ref 0–0.11)
IMM GRANULOCYTES NFR BLD AUTO: 0.8 % (ref 0–0.9)
KETONES UR STRIP.AUTO-MCNC: NEGATIVE MG/DL
LEUKOCYTE ESTERASE UR QL STRIP.AUTO: ABNORMAL
LIPASE SERPL-CCNC: 19 U/L (ref 11–82)
LYMPHOCYTES # BLD AUTO: 3.41 K/UL (ref 1–4.8)
LYMPHOCYTES NFR BLD: 45.1 % (ref 22–41)
MCH RBC QN AUTO: 31 PG (ref 27–33)
MCHC RBC AUTO-ENTMCNC: 32.1 G/DL (ref 33.6–35)
MCV RBC AUTO: 96.3 FL (ref 81.4–97.8)
MICRO URNS: ABNORMAL
MONOCYTES # BLD AUTO: 0.54 K/UL (ref 0–0.85)
MONOCYTES NFR BLD AUTO: 7.1 % (ref 0–13.4)
NEUTROPHILS # BLD AUTO: 3.25 K/UL (ref 2–7.15)
NEUTROPHILS NFR BLD: 43 % (ref 44–72)
NITRITE UR QL STRIP.AUTO: NEGATIVE
NRBC # BLD AUTO: 0 K/UL
NRBC BLD-RTO: 0 /100 WBC
PH UR STRIP.AUTO: 8 [PH] (ref 5–8)
PLATELET # BLD AUTO: 264 K/UL (ref 164–446)
PMV BLD AUTO: 8.6 FL (ref 9–12.9)
POTASSIUM SERPL-SCNC: 3.9 MMOL/L (ref 3.6–5.5)
PROT SERPL-MCNC: 7.5 G/DL (ref 6–8.2)
PROT UR QL STRIP: NEGATIVE MG/DL
RBC # BLD AUTO: 4.62 M/UL (ref 4.2–5.4)
RBC # URNS HPF: ABNORMAL /HPF
RBC UR QL AUTO: NEGATIVE
SODIUM SERPL-SCNC: 139 MMOL/L (ref 135–145)
SP GR UR STRIP.AUTO: 1.02
UROBILINOGEN UR STRIP.AUTO-MCNC: 1 MG/DL
WBC # BLD AUTO: 7.6 K/UL (ref 4.8–10.8)
WBC #/AREA URNS HPF: ABNORMAL /HPF

## 2019-08-18 PROCEDURE — 99285 EMERGENCY DEPT VISIT HI MDM: CPT

## 2019-08-18 PROCEDURE — 81025 URINE PREGNANCY TEST: CPT

## 2019-08-18 PROCEDURE — 80053 COMPREHEN METABOLIC PANEL: CPT

## 2019-08-18 PROCEDURE — 700111 HCHG RX REV CODE 636 W/ 250 OVERRIDE (IP): Performed by: EMERGENCY MEDICINE

## 2019-08-18 PROCEDURE — 96375 TX/PRO/DX INJ NEW DRUG ADDON: CPT

## 2019-08-18 PROCEDURE — 96365 THER/PROPH/DIAG IV INF INIT: CPT

## 2019-08-18 PROCEDURE — 81001 URINALYSIS AUTO W/SCOPE: CPT

## 2019-08-18 PROCEDURE — 96376 TX/PRO/DX INJ SAME DRUG ADON: CPT

## 2019-08-18 PROCEDURE — 83690 ASSAY OF LIPASE: CPT

## 2019-08-18 PROCEDURE — 700105 HCHG RX REV CODE 258: Performed by: EMERGENCY MEDICINE

## 2019-08-18 PROCEDURE — 85025 COMPLETE CBC W/AUTO DIFF WBC: CPT

## 2019-08-18 PROCEDURE — 87086 URINE CULTURE/COLONY COUNT: CPT

## 2019-08-18 PROCEDURE — 84703 CHORIONIC GONADOTROPIN ASSAY: CPT

## 2019-08-18 PROCEDURE — 99204 OFFICE O/P NEW MOD 45 MIN: CPT | Performed by: NURSE PRACTITIONER

## 2019-08-18 RX ORDER — MELOXICAM 7.5 MG/1
7.5 TABLET ORAL 2 TIMES DAILY PRN
Refills: 0 | COMMUNITY
Start: 2019-06-16 | End: 2019-08-18

## 2019-08-18 RX ORDER — METHOCARBAMOL 500 MG/1
500 TABLET, FILM COATED ORAL 3 TIMES DAILY
Refills: 0 | COMMUNITY
Start: 2019-06-07 | End: 2019-08-18

## 2019-08-18 RX ORDER — ONDANSETRON 4 MG/1
4 TABLET, ORALLY DISINTEGRATING ORAL EVERY 8 HOURS PRN
Qty: 10 TAB | Refills: 0 | Status: SHIPPED | OUTPATIENT
Start: 2019-08-18 | End: 2019-09-03

## 2019-08-18 RX ORDER — OXYCODONE HYDROCHLORIDE AND ACETAMINOPHEN 5; 325 MG/1; MG/1
1-2 TABLET ORAL EVERY 6 HOURS PRN
Qty: 10 TAB | Refills: 0 | Status: ON HOLD | OUTPATIENT
Start: 2019-08-18 | End: 2019-08-25

## 2019-08-18 RX ORDER — IBUPROFEN 200 MG
600 TABLET ORAL EVERY 6 HOURS PRN
COMMUNITY
End: 2019-08-20

## 2019-08-18 RX ORDER — MORPHINE SULFATE 4 MG/ML
4 INJECTION, SOLUTION INTRAMUSCULAR; INTRAVENOUS ONCE
Status: COMPLETED | OUTPATIENT
Start: 2019-08-18 | End: 2019-08-18

## 2019-08-18 RX ORDER — ONDANSETRON 2 MG/ML
4 INJECTION INTRAMUSCULAR; INTRAVENOUS
Status: COMPLETED | OUTPATIENT
Start: 2019-08-18 | End: 2019-08-18

## 2019-08-18 RX ORDER — CEFDINIR 300 MG/1
300 CAPSULE ORAL 2 TIMES DAILY
Qty: 14 CAP | Refills: 0 | Status: SHIPPED | OUTPATIENT
Start: 2019-08-18 | End: 2023-01-03

## 2019-08-18 RX ADMIN — MORPHINE SULFATE 4 MG: 4 INJECTION INTRAVENOUS at 16:47

## 2019-08-18 RX ADMIN — ONDANSETRON 4 MG: 2 INJECTION INTRAMUSCULAR; INTRAVENOUS at 16:47

## 2019-08-18 RX ADMIN — MORPHINE SULFATE 4 MG: 4 INJECTION INTRAVENOUS at 15:54

## 2019-08-18 RX ADMIN — ONDANSETRON 4 MG: 2 INJECTION INTRAMUSCULAR; INTRAVENOUS at 15:54

## 2019-08-18 RX ADMIN — CEFTRIAXONE SODIUM 2 G: 2 INJECTION, POWDER, FOR SOLUTION INTRAMUSCULAR; INTRAVENOUS at 17:19

## 2019-08-18 SDOH — HEALTH STABILITY: MENTAL HEALTH: HOW OFTEN DO YOU HAVE A DRINK CONTAINING ALCOHOL?: NEVER

## 2019-08-18 NOTE — ED TRIAGE NOTES
Pt to triage .  Chief Complaint   Patient presents with   • LLQ Pain     x 3 days   • Sent from Urgent Care     for further evaluation    • Nausea   • Diarrhea

## 2019-08-18 NOTE — ED PROVIDER NOTES
CHIEF COMPLAINT  Chief Complaint   Patient presents with   • LLQ Pain     x 3 days   • Sent from Urgent Care     for further evaluation    • Nausea   • Diarrhea       HPI  Janina Lantigua is a 48 y.o. female who presents with 3 days of left lower quadrant abdominal pain.  Pain is constant and moderately severe and worse with eating pain is located in the left abdomen.  History of prior remote diverticulitis.  She cannot remember if this is similar.  No flank pain.  No nausea or vomiting.  She did have some diarrhea this week.  No bloody diarrhea no recent antibiotic use.  No diabetes or immune compromise.  No gastritis peptic ulcer disease or pancreatitis.  Past surgical history limited to 3  sections.    REVIEW OF SYSTEMS  Pertinent positives include: Left-sided abdominal pain.  Dysuria.  Pertinent negatives include: Fever, cough, sore throat, rash, swelling, urgency, frequency.  10+ systems reviewed and negative.      PAST MEDICAL HISTORY  Past Medical History:   Diagnosis Date   • Asthma    • Bipolar 2 disorder, major depressive episode (HCC)    • Fibromyalgia    • Lumbago    • Major depression    • Psychiatric disorder     bipolar   • Psychiatric disorder     depression   • PTSD (post-traumatic stress disorder)        FAMILY HISTORY  Family History   Problem Relation Age of Onset   • Cancer Mother 43        melanoma,  from cancer spread at age 48.    • Bipolar disorder Father    • Alzheimer's Disease Maternal Grandmother    • Cancer Paternal Grandmother         breast cancer   • Cancer Paternal Grandfather         lung cancer       SOCIAL HISTORY  Social History     Tobacco Use   • Smoking status: Former Smoker     Packs/day: 0.50     Years: 30.00     Pack years: 15.00     Types: Cigarettes   • Smokeless tobacco: Never Used   Substance Use Topics   • Alcohol use: Never     Frequency: Never     Comment: 1-2 a month   • Drug use: No     Social History     Substance and Sexual Activity   Drug  Use No       SURGICAL HISTORY  Past Surgical History:   Procedure Laterality Date   • OTHER      ovarian cyst removed by laser surgery   • PRIMARY C SECTION      3 times       CURRENT MEDICATIONS    Current Outpatient Medications   Medication Sig Dispense Refill   • ibuprofen (MOTRIN) 200 MG Tab Take 200 mg by mouth every 6 hours as needed.         ALLERGIES  Allergies   Allergen Reactions   • Doxycycline        PHYSICAL EXAM  VITAL SIGNS: /68   Pulse 77   Temp 36 °C (96.8 °F) (Temporal)   Resp 16   Wt 67 kg (147 lb 11.3 oz)   SpO2 97%   BMI 22.46 kg/m²   Reviewed and normal blood pressure, afebrile  Constitutional: Well developed, Well nourished, appears to be in pain.  HENT: Normocephalic, atraumatic, bilateral external ears normal, oropharynx moist, No exudates or erythema.   Eyes: PERRLA, conjunctiva pink, no scleral icterus.   Cardiovascular: Regular S1-S2 without murmur, rub, gallop.  Respiratory: No rales, rhonchi, wheeze.  Gastrointestinal: Soft, moderate left especially lower quadrant abdominal tenderness with borderline guarding, no rebound or distention masses, bowel sounds normal.  Skin: No erythema, no rash.   Genitourinary:  No costovertebral angle tenderness.   Neurologic: Alert & oriented x 3, cranial nerves 2-12 intact by passive exam.  No focal deficit noted.  Psychiatric: Affect normal, Judgment normal, Mood normal.     DIFFERENTIAL DIAGNOSIS:  Diverticulitis, UTI, renal colic, fibroid, doubt ovarian cyst.    LABORATORY:  Results for orders placed or performed during the hospital encounter of 08/18/19   CBC WITH DIFFERENTIAL   Result Value Ref Range    WBC 7.6 4.8 - 10.8 K/uL    RBC 4.62 4.20 - 5.40 M/uL    Hemoglobin 14.3 12.0 - 16.0 g/dL    Hematocrit 44.5 37.0 - 47.0 %    MCV 96.3 81.4 - 97.8 fL    MCH 31.0 27.0 - 33.0 pg    MCHC 32.1 (L) 33.6 - 35.0 g/dL    RDW 46.3 35.9 - 50.0 fL    Platelet Count 264 164 - 446 K/uL    MPV 8.6 (L) 9.0 - 12.9 fL    Neutrophils-Polys 43.00 (L) 44.00  - 72.00 %    Lymphocytes 45.10 (H) 22.00 - 41.00 %    Monocytes 7.10 0.00 - 13.40 %    Eosinophils 3.30 0.00 - 6.90 %    Basophils 0.70 0.00 - 1.80 %    Immature Granulocytes 0.80 0.00 - 0.90 %    Nucleated RBC 0.00 /100 WBC    Neutrophils (Absolute) 3.25 2.00 - 7.15 K/uL    Lymphs (Absolute) 3.41 1.00 - 4.80 K/uL    Monos (Absolute) 0.54 0.00 - 0.85 K/uL    Eos (Absolute) 0.25 0.00 - 0.51 K/uL    Baso (Absolute) 0.05 0.00 - 0.12 K/uL    Immature Granulocytes (abs) 0.06 0.00 - 0.11 K/uL    NRBC (Absolute) 0.00 K/uL   COMP METABOLIC PANEL   Result Value Ref Range    Sodium 139 135 - 145 mmol/L    Potassium 3.9 3.6 - 5.5 mmol/L    Chloride 106 96 - 112 mmol/L    Co2 27 20 - 33 mmol/L    Anion Gap 6.0 0.0 - 11.9    Glucose 95 65 - 99 mg/dL    Bun 13 8 - 22 mg/dL    Creatinine 0.92 0.50 - 1.40 mg/dL    Calcium 9.6 8.5 - 10.5 mg/dL    AST(SGOT) 17 12 - 45 U/L    ALT(SGPT) 10 2 - 50 U/L    Alkaline Phosphatase 61 30 - 99 U/L    Total Bilirubin 0.5 0.1 - 1.5 mg/dL    Albumin 4.3 3.2 - 4.9 g/dL    Total Protein 7.5 6.0 - 8.2 g/dL    Globulin 3.2 1.9 - 3.5 g/dL    A-G Ratio 1.3 g/dL   LIPASE   Result Value Ref Range    Lipase 19 11 - 82 U/L   HCG QUAL SERUM   Result Value Ref Range    Beta-Hcg Qualitative Serum Negative Negative   URINALYSIS,CULTURE IF INDICATED   Result Value Ref Range    Color Yellow     Character Cloudy (A)     Specific Gravity 1.022 <1.035    Ph 8.0 5.0 - 8.0    Glucose Negative Negative mg/dL    Ketones Negative Negative mg/dL    Protein Negative Negative mg/dL    Bilirubin Negative Negative    Urobilinogen, Urine 1.0 Negative    Nitrite Negative Negative    Leukocyte Esterase Moderate (A) Negative    Occult Blood Negative Negative    Micro Urine Req Microscopic    BETA-HCG QUALITATIVE URINE   Result Value Ref Range    Beta-Hcg Urine Negative Negative   URINE MICROSCOPIC (W/UA)   Result Value Ref Range    WBC 20-50 (A) /hpf    RBC 0-2 /hpf    Bacteria Many (A) None /hpf    Epithelial Cells Many (A) /hpf    Urine culture pending    INTERVENTIONS:  Medications   cefTRIAXone (ROCEPHIN) 2 g in  mL IVPB (2 g Intravenous New Bag 8/18/19 1719)   morphine (pf) 4 mg/ml injection 4 mg (4 mg Intravenous Given 8/18/19 1554)   ondansetron (ZOFRAN) syringe/vial injection 4 mg (4 mg Intravenous Given 8/18/19 1647)   morphine (pf) 4 mg/ml injection 4 mg (4 mg Intravenous Given 8/18/19 1647)       COURSE & MEDICAL DECISION MAKING  Well-appearing patient presents with left-sided abdominal pain and has pyelonephritis without evidence of significant sepsis.  Urinalysis is unequivocal enough that I doubt CT imaging for diverticulitis would .  There is no pregnancy.  She is a good candidate for outpatient p.o. trial of therapy.    This patient has borderline or elevated blood pressure as recorded above and was instructed to followup with primary physician for comprehensive blood pressure evaluation and yearly fasting cholesterol assessment according to to CMS protocol.    PLAN:  New Prescriptions    CEFDINIR (OMNICEF) 300 MG CAP    Take 1 Cap by mouth 2 times a day.    ONDANSETRON (ZOFRAN ODT) 4 MG TABLET DISPERSIBLE    Take 1 Tab by mouth every 8 hours as needed.    OXYCODONE-ACETAMINOPHEN (PERCOCET) 5-325 MG TAB    Take 1-2 Tabs by mouth every 6 hours as needed (moderate to severe pain) for up to 3 days.       Ibuprofen Tylenol rest and fluids  2 days off work  Pyelonephritis handout given  Return for vomiting, dizziness, fever beyond 2 days, ill appearance, severe pain    [facility], Emergency Dept  [address] [facility] [facility] [address] [phone]  Schedule an appointment as soon as possible for a visit   As needed if you don't improve in 2 days or if you worsen      CONDITION: Stable.    FINAL IMPRESSION  1. Pyelonephritis          Electronically signed by: Shahid Mireles, 8/18/2019 3:49 PM

## 2019-08-18 NOTE — PROGRESS NOTES
Chief Complaint   Patient presents with   • GI Problem     LLQ x4 days Diarrhea sharp pain        HISTORY OF PRESENT ILLNESS: Patient is a 48 y.o. female who presents to urgent care today with complaints of abdominal pain. Notes for the past four days she has had lower, specifically left lower quadrant, pain. Pain is worse after eating. Admits to associated diarrhea, fever, chills, fatigue, nausea. Denies vomiting, blood/black in stools. Admits to history of diverticulitis in 1998, this feels worse. She has tried ibuprofen for symptom relief.      Patient Active Problem List    Diagnosis Date Noted   • Carpal tunnel syndrome of left wrist 05/17/2017   • Fibromyalgia 04/12/2017   • Episode of recurrent major depressive disorder (HCC) 04/12/2017   • Bipolar affective disorder in remission (HCC) 04/12/2017   • Anxiety 04/12/2017   • Panic attack 04/12/2017   • Chronic pain 04/12/2017   • Chronic left-sided low back pain with sciatica 04/12/2017   • Tobacco abuse 04/12/2017   • Overweight (BMI 25.0-29.9) 04/12/2017       Allergies:Doxycycline    Current Outpatient Medications Ordered in Epic   Medication Sig Dispense Refill   • ibuprofen (MOTRIN) 200 MG Tab Take 200 mg by mouth every 6 hours as needed.     • meloxicam (MOBIC) 7.5 MG Tab Take 7.5 mg by mouth 2 times a day as needed.  0   • methocarbamol (ROBAXIN) 500 MG Tab Take 500 mg by mouth 3 times a day.  0   • cyclobenzaprine (FLEXERIL) 5 MG tablet Take 1-2 Tabs by mouth 3 times a day as needed. (Patient not taking: Reported on 8/18/2019) 30 Tab 3   • duloxetine (CYMBALTA) 30 MG Cap DR Particles Take 1 Cap by mouth every day. (Patient not taking: Reported on 7/24/2017) 30 Cap 5   • nicotine polacrilex (NICORETTE) 4 MG gum Chew 1 piece of gum every 1 to 2 hours (maximum: 24 pieces/day); to increase chances of quitting, chew at least 9 pieces/day during the first 6 weeks. (Patient not taking: Reported on 7/24/2017) 90 Each 5     No current Epic-ordered  "facility-administered medications on file.        Past Medical History:   Diagnosis Date   • Asthma    • Bipolar 2 disorder, major depressive episode (HCC)    • Fibromyalgia    • Lumbago    • Major depression    • Psychiatric disorder     bipolar   • Psychiatric disorder     depression   • PTSD (post-traumatic stress disorder)        Social History     Tobacco Use   • Smoking status: Former Smoker     Packs/day: 0.50     Years: 30.00     Pack years: 15.00     Types: Cigarettes   • Smokeless tobacco: Never Used   Substance Use Topics   • Alcohol use: Yes     Comment: 1-2 a month   • Drug use: No       Family Status   Relation Name Status   • Mo     • Fa  Alive   • MGMo     • PGMo     • PGFa       Family History   Problem Relation Age of Onset   • Cancer Mother 43        melanoma,  from cancer spread at age 48.    • Bipolar disorder Father    • Alzheimer's Disease Maternal Grandmother    • Cancer Paternal Grandmother         breast cancer   • Cancer Paternal Grandfather         lung cancer       ROS:  Review of Systems   Constitutional: Positive for fever, chills, malaise, and fatigue.   HENT: Negative for ear pain, nosebleeds, congestion, sore throat and neck pain.    Eyes: Negative for vision changes.   Neuro: Negative for headache, sensory changes, weakness, seizure, LOC.   Cardiovascular: Negative for chest pain, palpitations, orthopnea and leg swelling.   Respiratory: Negative for cough, sputum production, shortness of breath and wheezing.   Gastrointestinal: Positive for abdominal pain, nausea, and diarrhea. Negative for vomiting.   Genitourinary: Negative for dysuria, urgency and frequency.  Musculoskeletal: Negative for falls, neck pain, back pain, joint pain, myalgias.   Skin: Negative for rash, diaphoresis.     Exam:  /70   Pulse 71   Temp 36.9 °C (98.4 °F)   Resp 12   Ht 1.727 m (5' 8\")   Wt 67 kg (147 lb 9.6 oz)   SpO2 97%   General: well-nourished, " well-developed female in NAD  Head: normocephalic, atraumatic  Eyes: PERRLA, no conjunctival injection, acuity grossly intact, lids normal.  Ears: normal shape and symmetry, no tenderness, no discharge. External canals are without any significant edema or erythema. Tympanic membranes are without any inflammation, no effusion. Gross auditory acuity is intact.  Nose: symmetrical without tenderness, no discharge.  Mouth/Throat: reasonable hygiene, no erythema, exudates or tonsillar enlargement.  Neck: no masses, range of motion within normal limits, no tracheal deviation. No obvious thyroid enlargement.   Lymph: no cervical adenopathy. No supraclavicular adenopathy.   Neuro: alert and oriented. Cranial nerves 1-12 grossly intact. No sensory deficit.   Cardiovascular: regular rate and rhythm. No edema.  Pulmonary: no distress. Chest is symmetrical with respiration, no wheezes, crackles, or rhonchi.   Abdomen: soft, + guarding, no hepatosplenomegaly.  There is tenderness to her right upper quadrant, left upper quadrant, and in more specifically her left lower quadrant.  Musculoskeletal: no clubbing, appropriate muscle tone, gait is stable.  Skin: warm, dry, intact, no clubbing, no cyanosis, no rashes.   Psych: appropriate mood, affect, judgement.         Assessment/Plan:  1. Acute left lower quadrant pain           The patient is a pleasant, uncomfortable appearing 48-year-old female who presents clinic with complaints of lower abdominal pain.  She is guarding her abdomen with tenderness.  Differential diagnoses include but are not limited to: Diverticulitis, abscess, perforation, diverticulosis, appendicitis, ovarian cyst.  At this time, I feel the patient requires a higher level of care in the ED for closer monitoring, pain management, stat lab work and/or imaging for further evaluation. This has been discussed with the patient and she states agreement and understanding. The patient is stable to leave POV at this time  and will go directly to ED without delay, will remain n.p.o.         Please note that this dictation was created using voice recognition software. I have made every reasonable attempt to correct obvious errors, but I expect that there are errors of grammar and possibly content that I did not discover before finalizing the note.      EVAN Kirk.

## 2019-08-19 NOTE — ED NOTES
Pt given discharge instructions and able to ambulate from department under her own power.  She was being driven home by family member.

## 2019-08-19 NOTE — DISCHARGE INSTRUCTIONS
Pyelonephritis  (Urinary Tract Infection Involving the Kidney)    Start antibiotics in 24 hours.  Take ibuprofen for fever and Tylenol for pain.  Rest and drink fluids..  Return for uncontrolled vomiting, ill appearance or dizziness with standing.  See your doctor or return if not better in 2 days.     You had a borderline or high normal blood pressure reading today.  This does not necessarily mean you have hypertension.  Please followup with your/a primary physician for comprehensive blood pressure evaluation and yearly fasting cholesterol assessment.  BP Readings from Last 3 Encounters:   08/18/19 110/61   08/18/19 102/70   07/24/17 (!) 98/69         Pyelonephritis is an inflammation (soreness) of the kidney. It is generally caused by infection. It usually affects only one kidney. It may affect both. Usually these kidney infections have spread from the lower urinary tract. Because the urethra (the opening to the bladder) is much shorter in females than in males, urinary tract infections are much more common in females.    SYMPTOMS (WHAT SEEMS TO BE THE PROBLEM)  Usually infections of the kidney have an abrupt onset of chills and fever. There is often an ache in the flank (the back near the lower ribs). There is often a generalized malaise. There may be nausea (feeling sick to your stomach) and vomiting. Some times there are symptoms (problems) of cystitis (bladder infection and inflammation). These symptoms often include urgency, increased frequency of urination, and burning with urination.    Pyelonephritis will usually respond to antibiotics (medications that kill bacteria). When this illness is recognized and treated promptly, complications are not common. Hospitalization may be required. If treated at home, take all the medicine given to you until finished. You may feel better in a few days, but TAKE ALL THE MEDICINE or the infection may not respond. It can become more difficult to treat. Response can  generally be expected in 7 to 10 days.    Drink lots of fluid, 3 to 4 quarts a day. Cranberry juice is especially recommended, in addition to large amounts of water. Avoid caffeine, tea and carbonated beverages (Coke®, 7-Up®, etc.), because they tend to irritate the bladder. Males should avoid alcohol as this may irritate the prostate. Aspirin, ibuprofen (Advil® or Motrin®) or acetaminophen (Tylenol®) may be used for temperature and/or discomfort. Only use these if your caregiver has not given medications that interfere.    TO PREVENT FURTHER INFECTIONS:  Empty the bladder often. Avoid holding urine for long periods of time.  After a bowel movement, women should cleanse front to back. Only use each tissue once.  Empty the bladder before and after sexual intercourse.    If you develop an increase of back pain, fever, nausea, vomiting, or your symptoms are no better in three (3) days, seek medical attention. Seek medical attention sooner if you are getting worse.    Document Released: 12/18/2006    Between® Patient Information ©2008 [employer].

## 2019-08-20 ENCOUNTER — HOSPITAL ENCOUNTER (INPATIENT)
Facility: MEDICAL CENTER | Age: 49
LOS: 5 days | DRG: 243 | End: 2019-08-25
Attending: EMERGENCY MEDICINE | Admitting: HOSPITALIST
Payer: MEDICAID

## 2019-08-20 ENCOUNTER — APPOINTMENT (OUTPATIENT)
Dept: RADIOLOGY | Facility: MEDICAL CENTER | Age: 49
DRG: 243 | End: 2019-08-20
Attending: EMERGENCY MEDICINE
Payer: MEDICAID

## 2019-08-20 DIAGNOSIS — N39.0 ACUTE UTI: ICD-10-CM

## 2019-08-20 DIAGNOSIS — G44.209 TENSION-TYPE HEADACHE, NOT INTRACTABLE, UNSPECIFIED CHRONICITY PATTERN: ICD-10-CM

## 2019-08-20 DIAGNOSIS — R00.1 BRADYCARDIA: ICD-10-CM

## 2019-08-20 DIAGNOSIS — R10.84 GENERALIZED ABDOMINAL PAIN: ICD-10-CM

## 2019-08-20 PROBLEM — R10.31 RIGHT LOWER QUADRANT ABDOMINAL PAIN: Status: ACTIVE | Noted: 2019-08-20

## 2019-08-20 LAB
ALBUMIN SERPL BCP-MCNC: 4 G/DL (ref 3.2–4.9)
ALBUMIN/GLOB SERPL: 1.3 G/DL
ALP SERPL-CCNC: 56 U/L (ref 30–99)
ALT SERPL-CCNC: 10 U/L (ref 2–50)
AMPHET UR QL SCN: NEGATIVE
ANION GAP SERPL CALC-SCNC: 7 MMOL/L (ref 0–11.9)
APPEARANCE UR: CLEAR
AST SERPL-CCNC: 15 U/L (ref 12–45)
BARBITURATES UR QL SCN: NEGATIVE
BASOPHILS # BLD AUTO: 0.6 % (ref 0–1.8)
BASOPHILS # BLD: 0.03 K/UL (ref 0–0.12)
BENZODIAZ UR QL SCN: NEGATIVE
BILIRUB SERPL-MCNC: 0.3 MG/DL (ref 0.1–1.5)
BILIRUB UR QL STRIP.AUTO: NEGATIVE
BUN SERPL-MCNC: 16 MG/DL (ref 8–22)
BZE UR QL SCN: NEGATIVE
CALCIUM SERPL-MCNC: 8.9 MG/DL (ref 8.5–10.5)
CANNABINOIDS UR QL SCN: NEGATIVE
CHLORIDE SERPL-SCNC: 108 MMOL/L (ref 96–112)
CO2 SERPL-SCNC: 30 MMOL/L (ref 20–33)
COLOR UR: YELLOW
CREAT SERPL-MCNC: 0.97 MG/DL (ref 0.5–1.4)
EKG IMPRESSION: NORMAL
EKG IMPRESSION: NORMAL
EOSINOPHIL # BLD AUTO: 0.14 K/UL (ref 0–0.51)
EOSINOPHIL NFR BLD: 2.7 % (ref 0–6.9)
ERYTHROCYTE [DISTWIDTH] IN BLOOD BY AUTOMATED COUNT: 45.8 FL (ref 35.9–50)
GLOBULIN SER CALC-MCNC: 3.1 G/DL (ref 1.9–3.5)
GLUCOSE SERPL-MCNC: 87 MG/DL (ref 65–99)
GLUCOSE UR STRIP.AUTO-MCNC: NEGATIVE MG/DL
HCT VFR BLD AUTO: 44.8 % (ref 37–47)
HGB BLD-MCNC: 14.2 G/DL (ref 12–16)
IMM GRANULOCYTES # BLD AUTO: 0 K/UL (ref 0–0.11)
IMM GRANULOCYTES NFR BLD AUTO: 0 % (ref 0–0.9)
KETONES UR STRIP.AUTO-MCNC: ABNORMAL MG/DL
LEUKOCYTE ESTERASE UR QL STRIP.AUTO: NEGATIVE
LIPASE SERPL-CCNC: 30 U/L (ref 11–82)
LYMPHOCYTES # BLD AUTO: 2.15 K/UL (ref 1–4.8)
LYMPHOCYTES NFR BLD: 41.3 % (ref 22–41)
MAGNESIUM SERPL-MCNC: 1.9 MG/DL (ref 1.5–2.5)
MCH RBC QN AUTO: 30.3 PG (ref 27–33)
MCHC RBC AUTO-ENTMCNC: 31.7 G/DL (ref 33.6–35)
MCV RBC AUTO: 95.5 FL (ref 81.4–97.8)
METHADONE UR QL SCN: NEGATIVE
MICRO URNS: ABNORMAL
MONOCYTES # BLD AUTO: 0.4 K/UL (ref 0–0.85)
MONOCYTES NFR BLD AUTO: 7.7 % (ref 0–13.4)
NEUTROPHILS # BLD AUTO: 2.48 K/UL (ref 2–7.15)
NEUTROPHILS NFR BLD: 47.7 % (ref 44–72)
NITRITE UR QL STRIP.AUTO: NEGATIVE
NRBC # BLD AUTO: 0 K/UL
NRBC BLD-RTO: 0 /100 WBC
OPIATES UR QL SCN: POSITIVE
OXYCODONE UR QL SCN: POSITIVE
PCP UR QL SCN: NEGATIVE
PH UR STRIP.AUTO: 7 [PH] (ref 5–8)
PLATELET # BLD AUTO: 251 K/UL (ref 164–446)
PMV BLD AUTO: 8.4 FL (ref 9–12.9)
POTASSIUM SERPL-SCNC: 3.8 MMOL/L (ref 3.6–5.5)
PROPOXYPH UR QL SCN: NEGATIVE
PROT SERPL-MCNC: 7.1 G/DL (ref 6–8.2)
PROT UR QL STRIP: NEGATIVE MG/DL
RBC # BLD AUTO: 4.69 M/UL (ref 4.2–5.4)
RBC UR QL AUTO: NEGATIVE
SODIUM SERPL-SCNC: 145 MMOL/L (ref 135–145)
SP GR UR STRIP.AUTO: 1.02
TROPONIN T SERPL-MCNC: <6 NG/L (ref 6–19)
TSH SERPL DL<=0.005 MIU/L-ACNC: 0.78 UIU/ML (ref 0.38–5.33)
UROBILINOGEN UR STRIP.AUTO-MCNC: 1 MG/DL
WBC # BLD AUTO: 5.2 K/UL (ref 4.8–10.8)

## 2019-08-20 PROCEDURE — 80307 DRUG TEST PRSMV CHEM ANLYZR: CPT

## 2019-08-20 PROCEDURE — 93005 ELECTROCARDIOGRAM TRACING: CPT | Performed by: EMERGENCY MEDICINE

## 2019-08-20 PROCEDURE — 94760 N-INVAS EAR/PLS OXIMETRY 1: CPT

## 2019-08-20 PROCEDURE — 93005 ELECTROCARDIOGRAM TRACING: CPT

## 2019-08-20 PROCEDURE — 99223 1ST HOSP IP/OBS HIGH 75: CPT | Performed by: HOSPITALIST

## 2019-08-20 PROCEDURE — 84484 ASSAY OF TROPONIN QUANT: CPT

## 2019-08-20 PROCEDURE — 700105 HCHG RX REV CODE 258: Performed by: EMERGENCY MEDICINE

## 2019-08-20 PROCEDURE — 700101 HCHG RX REV CODE 250: Performed by: HOSPITALIST

## 2019-08-20 PROCEDURE — 700111 HCHG RX REV CODE 636 W/ 250 OVERRIDE (IP): Performed by: HOSPITALIST

## 2019-08-20 PROCEDURE — 99255 IP/OBS CONSLTJ NEW/EST HI 80: CPT | Performed by: INTERNAL MEDICINE

## 2019-08-20 PROCEDURE — 700105 HCHG RX REV CODE 258: Performed by: HOSPITALIST

## 2019-08-20 PROCEDURE — 99285 EMERGENCY DEPT VISIT HI MDM: CPT

## 2019-08-20 PROCEDURE — 700102 HCHG RX REV CODE 250 W/ 637 OVERRIDE(OP): Performed by: HOSPITALIST

## 2019-08-20 PROCEDURE — 74177 CT ABD & PELVIS W/CONTRAST: CPT

## 2019-08-20 PROCEDURE — 85025 COMPLETE CBC W/AUTO DIFF WBC: CPT

## 2019-08-20 PROCEDURE — 80053 COMPREHEN METABOLIC PANEL: CPT

## 2019-08-20 PROCEDURE — 83735 ASSAY OF MAGNESIUM: CPT

## 2019-08-20 PROCEDURE — 81003 URINALYSIS AUTO W/O SCOPE: CPT

## 2019-08-20 PROCEDURE — 770020 HCHG ROOM/CARE - TELE (206)

## 2019-08-20 PROCEDURE — A9270 NON-COVERED ITEM OR SERVICE: HCPCS | Performed by: HOSPITALIST

## 2019-08-20 PROCEDURE — 700117 HCHG RX CONTRAST REV CODE 255: Performed by: EMERGENCY MEDICINE

## 2019-08-20 PROCEDURE — 83690 ASSAY OF LIPASE: CPT

## 2019-08-20 PROCEDURE — 84443 ASSAY THYROID STIM HORMONE: CPT

## 2019-08-20 RX ORDER — OXYCODONE HYDROCHLORIDE 5 MG/1
5 TABLET ORAL
Status: DISCONTINUED | OUTPATIENT
Start: 2019-08-20 | End: 2019-08-25 | Stop reason: HOSPADM

## 2019-08-20 RX ORDER — AMOXICILLIN 250 MG
2 CAPSULE ORAL 2 TIMES DAILY
Status: DISCONTINUED | OUTPATIENT
Start: 2019-08-21 | End: 2019-08-25 | Stop reason: HOSPADM

## 2019-08-20 RX ORDER — OXYCODONE HYDROCHLORIDE 5 MG/1
2.5 TABLET ORAL
Status: DISCONTINUED | OUTPATIENT
Start: 2019-08-20 | End: 2019-08-25 | Stop reason: HOSPADM

## 2019-08-20 RX ORDER — SODIUM CHLORIDE AND POTASSIUM CHLORIDE 150; 900 MG/100ML; MG/100ML
INJECTION, SOLUTION INTRAVENOUS CONTINUOUS
Status: DISCONTINUED | OUTPATIENT
Start: 2019-08-20 | End: 2019-08-22

## 2019-08-20 RX ORDER — NICOTINE 21 MG/24HR
14 PATCH, TRANSDERMAL 24 HOURS TRANSDERMAL
Status: DISCONTINUED | OUTPATIENT
Start: 2019-08-20 | End: 2019-08-25 | Stop reason: HOSPADM

## 2019-08-20 RX ORDER — HYDROMORPHONE HYDROCHLORIDE 1 MG/ML
0.25 INJECTION, SOLUTION INTRAMUSCULAR; INTRAVENOUS; SUBCUTANEOUS
Status: DISCONTINUED | OUTPATIENT
Start: 2019-08-20 | End: 2019-08-25 | Stop reason: HOSPADM

## 2019-08-20 RX ORDER — SODIUM CHLORIDE 9 MG/ML
1000 INJECTION, SOLUTION INTRAVENOUS ONCE
Status: COMPLETED | OUTPATIENT
Start: 2019-08-20 | End: 2019-08-20

## 2019-08-20 RX ORDER — POLYETHYLENE GLYCOL 3350 17 G/17G
1 POWDER, FOR SOLUTION ORAL
Status: DISCONTINUED | OUTPATIENT
Start: 2019-08-20 | End: 2019-08-25 | Stop reason: HOSPADM

## 2019-08-20 RX ORDER — BISACODYL 10 MG
10 SUPPOSITORY, RECTAL RECTAL
Status: DISCONTINUED | OUTPATIENT
Start: 2019-08-20 | End: 2019-08-25 | Stop reason: HOSPADM

## 2019-08-20 RX ORDER — ACETAMINOPHEN 325 MG/1
650 TABLET ORAL EVERY 6 HOURS PRN
Status: DISCONTINUED | OUTPATIENT
Start: 2019-08-20 | End: 2019-08-23

## 2019-08-20 RX ADMIN — OXYCODONE HYDROCHLORIDE 5 MG: 5 TABLET ORAL at 17:00

## 2019-08-20 RX ADMIN — IOHEXOL 100 ML: 350 INJECTION, SOLUTION INTRAVENOUS at 12:37

## 2019-08-20 RX ADMIN — OXYCODONE HYDROCHLORIDE 5 MG: 5 TABLET ORAL at 22:42

## 2019-08-20 RX ADMIN — ACETAMINOPHEN 650 MG: 325 TABLET ORAL at 17:00

## 2019-08-20 RX ADMIN — NICOTINE 14 MG: 14 PATCH, EXTENDED RELEASE TRANSDERMAL at 15:48

## 2019-08-20 RX ADMIN — POTASSIUM CHLORIDE AND SODIUM CHLORIDE: 900; 150 INJECTION, SOLUTION INTRAVENOUS at 15:48

## 2019-08-20 RX ADMIN — CEFTRIAXONE SODIUM 1 G: 1 INJECTION, POWDER, FOR SOLUTION INTRAMUSCULAR; INTRAVENOUS at 17:01

## 2019-08-20 RX ADMIN — SODIUM CHLORIDE 1000 ML: 9 INJECTION, SOLUTION INTRAVENOUS at 10:33

## 2019-08-20 ASSESSMENT — LIFESTYLE VARIABLES
ON A TYPICAL DAY WHEN YOU DRINK ALCOHOL HOW MANY DRINKS DO YOU HAVE: 0
AVERAGE NUMBER OF DAYS PER WEEK YOU HAVE A DRINK CONTAINING ALCOHOL: 0
HAVE PEOPLE ANNOYED YOU BY CRITICIZING YOUR DRINKING: NO
HOW MANY TIMES IN THE PAST YEAR HAVE YOU HAD 5 OR MORE DRINKS IN A DAY: 0
EVER_SMOKED: YES
TOTAL SCORE: 0
EVER HAD A DRINK FIRST THING IN THE MORNING TO STEADY YOUR NERVES TO GET RID OF A HANGOVER: NO
ALCOHOL_USE: NO
HAVE YOU EVER FELT YOU SHOULD CUT DOWN ON YOUR DRINKING: NO
EVER FELT BAD OR GUILTY ABOUT YOUR DRINKING: NO
TOTAL SCORE: 0
CONSUMPTION TOTAL: NEGATIVE
DOES PATIENT WANT TO STOP DRINKING: NO
TOTAL SCORE: 0

## 2019-08-20 ASSESSMENT — COGNITIVE AND FUNCTIONAL STATUS - GENERAL
SUGGESTED CMS G CODE MODIFIER DAILY ACTIVITY: CH
MOBILITY SCORE: 24
DAILY ACTIVITIY SCORE: 24
SUGGESTED CMS G CODE MODIFIER MOBILITY: CH

## 2019-08-20 ASSESSMENT — ENCOUNTER SYMPTOMS
DIZZINESS: 1
NECK PAIN: 1
BACK PAIN: 1
WEIGHT LOSS: 1
ABDOMINAL PAIN: 1

## 2019-08-20 ASSESSMENT — PATIENT HEALTH QUESTIONNAIRE - PHQ9
SUM OF ALL RESPONSES TO PHQ9 QUESTIONS 1 AND 2: 0
1. LITTLE INTEREST OR PLEASURE IN DOING THINGS: NOT AT ALL
2. FEELING DOWN, DEPRESSED, IRRITABLE, OR HOPELESS: NOT AT ALL

## 2019-08-20 NOTE — CONSULTS
Cardiology Consult Note    Date note created:    8/20/2019          Patient ID:   Name:             Janina Lantigua     YOB: 1970  Age:                 48 y.o.  female   MRN:               [id number]               Referring Physician: Dr. Pang                                                  Reason for Consult:      Bradycardia    History of Present Illness:    48-year-old female with no prior cardiac history who presented to the ER with dizziness.  She reports being in her usual state of health until last night when she had acute onset of dizziness which she describes as feeling presyncopal.  She denies any associated palpitations, dyspnea or chest discomfort.  She went to bed and on waking up this morning she had ongoing dizziness which steadily worsened so she decided to present to the ER for evaluation.  She is currently asymptomatic but reports that any movement makes her dizzy.  She reports a prior history of syncope at which time her work-up was unremarkable.  Her last syncopal episode was about 20 years ago.  She has never been told that she is bradycardic.  She denies any over-the-counter medications or any prescription medications except recent antibiotics.    2 days ago she was seen in the ER for abdominal discomfort and was found to have UTI/pyelonephritis for which she was started on antibiotics.    Review of Systems:      A full review of systems was completed and all pertinent positives and negatives are included in the HPI above. All others reviewed and negative.     Past Medical History:   Past Medical History:   Diagnosis Date   • Asthma    • Bipolar 2 disorder, major depressive episode (HCC)    • Fibromyalgia 2010   • Lumbago    • Major depression 1996   • Psychiatric disorder     bipolar   • Psychiatric disorder     depression   • PTSD (post-traumatic stress disorder)        Past Surgical History:  Past Surgical History:   Procedure Laterality Date   • OTHER      ovarian cyst  "removed by laser surgery   • PRIMARY C SECTION      3 times       Family History:  No premature coronary disease in the family.    Social History:  Patient smokes about a pack every 2-3 days.  No alcohol or recreational drug use.    Hospital Medications:  No current facility-administered medications for this encounter.     Current Outpatient Medications:   •  cefdinir (OMNICEF) 300 MG Cap, Take 1 Cap by mouth 2 times a day., Disp: 14 Cap, Rfl: 0  •  oxyCODONE-acetaminophen (PERCOCET) 5-325 MG Tab, Take 1-2 Tabs by mouth every 6 hours as needed (moderate to severe pain) for up to 3 days., Disp: 10 Tab, Rfl: 0  •  ondansetron (ZOFRAN ODT) 4 MG TABLET DISPERSIBLE, Take 1 Tab by mouth every 8 hours as needed., Disp: 10 Tab, Rfl: 0    Medication Allergy:  Allergies   Allergen Reactions   • Doxycycline Shortness of Breath     \"Chest pain\"       Physical Exam:  Vitals/ General Appearance:   Weight/BMI: Body mass index is 22.46 kg/m².  /53   Pulse (!) 41   Temp 36.6 °C (97.9 °F) (Temporal)   Resp 19   Ht 1.727 m (5' 8\")   SpO2 100%   Vitals:    08/20/19 1101 08/20/19 1130 08/20/19 1200 08/20/19 1315   BP: (!) 91/47 (!) 94/58 100/58 118/53   Pulse: (!) 36 (!) 36 (!) 37 (!) 41   Resp: 16 13 13 19   Temp:       TempSrc:       SpO2: 100%  95% 100%   Height:           Constitutional:   Well developed, Well nourished, No acute distress   HEENT:  Normocephalic, Atraumatic. Throat is supple.   Eyes: Conjunctiva normal  Neck:  Normal range of motion, no JVD.   Cardiovascular: Bradycardic, regular rhythm, No murmurs, No rubs, No gallops. Extremities with intact distal pulses, no cyanosis, or edema.   Lungs:  Normal breath sounds, breath sounds clear to auscultation bilaterally,  no crackles, no wheezing.   Abdomen:  Soft, mild tenderness to palpation, no distension  Skin: No rash  Neurologic: Alert & oriented x 3   Psychiatric: Affect normal     MDM (Data Review):     Records reviewed and summarized in current " documentation    Lab Data Review:  Recent Labs     08/18/19  1545 08/20/19  1025   WBC 7.6 5.2   RBC 4.62 4.69   HEMOGLOBIN 14.3 14.2   HEMATOCRIT 44.5 44.8   MCV 96.3 95.5   MCH 31.0 30.3   MCHC 32.1* 31.7*   RDW 46.3 45.8   PLATELETCT 264 251   MPV 8.6* 8.4*     Recent Labs     08/18/19  1402 08/20/19  1025   SODIUM 139 145   POTASSIUM 3.9 3.8   CHLORIDE 106 108   CO2 27 30   GLUCOSE 95 87   BUN 13 16   CREATININE 0.92 0.97   CALCIUM 9.6 8.9     Labs reviewed as noted above.  Normal electrolytes.  Normal creatinine.  Normal TSH.    Imaging/Procedures Review:      EKG performed today at 10:08 AM was personally reviewed and per my interpretation shows sinus bradycardia at 37 bpm.  Low voltage QRS.    MDM (Assessment and Plan):     There are no active hospital problems to display for this patient.    Sinus bradycardia: With associated symptoms however her dizziness could also be secondary to her recent urinary tract infection.  Her blood pressure is normal.  Her baseline heart rate is unclear however in 2017 she had a clinic visit where her heart rate was noted to be 50 bpm.  She is currently not on any AV taj blocking agents.  Her thyroid level is normal.  Her electrolytes are normal as well.    Continue to avoid AV taj blocking agents.  Recommend monitoring on telemetry overnight.  Treatment of UTI per primary team.  Patient will be reevaluated tomorrow morning if she continues to be bradycardic, we may consider a exercise treadmill test to evaluate for chronotropic competence.  We will keep her n.p.o. after midnight.  An echocardiogram has been ordered to evaluate her LV function.  No urgent need for permanent pacemaker at this time as the patient is asymptomatic at rest and normotensive.    Will follow.    Thank you for allowing me to participate in the care of this patient. Please do not hesitate to contact me with any questions.    Prerna Chery MD  Cardiologist  [facility]

## 2019-08-20 NOTE — PROGRESS NOTES
2 RN skin check complete.   Devices in place: glasses.  Skin assessed under devices YES clean dry intact.  Confirmed pressure ulcers found on N/A.  New potential pressure ulcers noted on N/A.  The following interventions in place: pt ambulatory, and turns self in bed.    Skin clean, dry and intact   Bilateral ears intact and blanchable. Pt does wear glasses at all times

## 2019-08-20 NOTE — PROGRESS NOTES
Pt admitted to telemetry 708-2. Pt alert and oriented x4. Pt has some discomfort in lower abdomen, but does not want to take pain medication at this time. Telemetry on. MIV fluids infusing. Pt oriented to room and call light. Call light within reach. Bed low and locked.

## 2019-08-20 NOTE — ED PROVIDER NOTES
ED Provider Note    CHIEF COMPLAINT  Chief Complaint   Patient presents with   • RLQ Pain   • Flank Pain   • Dizziness       HPI  Janina Lantigua is a 48 y.o. female who presents to the emergency department complaining of abdominal pain.  Patient was here couple of days ago for abdominal pain and diagnosed with a UTI.  She comes in today because she is feeling worse despite taking her medications.  Patient states symptoms started several days ago with nausea, vomiting and diarrhea.  She had vomiting and diarrhea for about a day mostly of pain left side of her abdomen.  Since that time the pain is migrated towards the right lower quadrant.  With her nausea and diarrhea have improved.  She is been having continued dysuria and now she feels very weak and dizzy.  She feels lightheaded when she stands up.  She denies any chest pain or shortness of breath.  She denies any history of cardiac pathology.  Denies any blood pressure medications.  Denies any other aggravating leaving factors or associated complaints,     REVIEW OF SYSTEMS  See HPI for further details. All other systems are negative.    PAST MEDICAL HISTORY  Past Medical History:   Diagnosis Date   • Asthma    • Bipolar 2 disorder, major depressive episode (HCC)    • Fibromyalgia    • Lumbago    • Major depression    • Psychiatric disorder     bipolar   • Psychiatric disorder     depression   • PTSD (post-traumatic stress disorder)        FAMILY HISTORY  Family History   Problem Relation Age of Onset   • Cancer Mother 43        melanoma,  from cancer spread at age 48.    • Bipolar disorder Father    • Alzheimer's Disease Maternal Grandmother    • Cancer Paternal Grandmother         breast cancer   • Cancer Paternal Grandfather         lung cancer       SOCIAL HISTORY  Social History     Socioeconomic History   • Marital status: Single     Spouse name: Not on file   • Number of children: Not on file   • Years of education: Not on file   • Highest  education level: Not on file   Occupational History   • Not on file   Social Needs   • Financial resource strain: Not on file   • Food insecurity:     Worry: Not on file     Inability: Not on file   • Transportation needs:     Medical: Not on file     Non-medical: Not on file   Tobacco Use   • Smoking status: Former Smoker     Packs/day: 0.50     Years: 30.00     Pack years: 15.00     Types: Cigarettes   • Smokeless tobacco: Never Used   Substance and Sexual Activity   • Alcohol use: Never     Frequency: Never     Comment: 1-2 a month   • Drug use: No   • Sexual activity: Not Currently     Partners: Male   Lifestyle   • Physical activity:     Days per week: Not on file     Minutes per session: Not on file   • Stress: Not on file   Relationships   • Social connections:     Talks on phone: Not on file     Gets together: Not on file     Attends Anabaptist service: Not on file     Active member of club or organization: Not on file     Attends meetings of clubs or organizations: Not on file     Relationship status: Not on file   • Intimate partner violence:     Fear of current or ex partner: Not on file     Emotionally abused: Not on file     Physically abused: Not on file     Forced sexual activity: Not on file   Other Topics Concern   • Not on file   Social History Narrative   • Not on file       SURGICAL HISTORY  Past Surgical History:   Procedure Laterality Date   • OTHER      ovarian cyst removed by laser surgery   • PRIMARY C SECTION      3 times       CURRENT MEDICATIONS  Home Medications     Reviewed by Mickey Lange R.N. (Registered Nurse) on 08/20/19 at 1014  Med List Status: Not Addressed   Medication Last Dose Status   cefdinir (OMNICEF) 300 MG Cap 8/20/2019 Active   ibuprofen (MOTRIN) 200 MG Tab 8/11/2019 Active   ondansetron (ZOFRAN ODT) 4 MG TABLET DISPERSIBLE 8/20/2019 Active   oxyCODONE-acetaminophen (PERCOCET) 5-325 MG Tab 8/20/2019 Active                ALLERGIES  Allergies   Allergen Reactions   •  "Doxycycline Shortness of Breath     \"Chest pain\"       PHYSICAL EXAM  VITAL SIGNS: /58   Pulse (!) 43   Temp 36.6 °C (97.9 °F) (Temporal)   Resp 16   Ht 1.727 m (5' 8\")   SpO2 100%   BMI 22.46 kg/m²    Constitutional: Awake alert nontoxic no acute distress.  HENT: Normocephalic, Atraumatic, Bilateral external ears normal, Oropharynx moist, No oral exudates, Nose normal.   Eyes: PERRL, EOMI, Conjunctiva normal, No discharge.   Neck: Normal range of motion, No tenderness, Supple, No stridor.   Lymphatic: No lymphadenopathy noted.   Cardiovascular: Bradycardic no murmurs rubs or gallops  Thorax & Lungs: Normal breath sounds, No respiratory distress, No wheezing, No chest tenderness.   Abdomen: Diffusely tender most in the right lower quadrant no peritonitis  Skin: Warm, Dry, No erythema, No rash.   Back: No tenderness, No CVA tenderness.    Musculoskeletal: Good range of motion in all major joints.  No asymmetric edema, good pulses  Neurologic: Alert,  No focal deficits normal.  Psychiatric: Anxious      Results for orders placed or performed during the hospital encounter of 08/20/19   CBC WITH DIFFERENTIAL   Result Value Ref Range    WBC 5.2 4.8 - 10.8 K/uL    RBC 4.69 4.20 - 5.40 M/uL    Hemoglobin 14.2 12.0 - 16.0 g/dL    Hematocrit 44.8 37.0 - 47.0 %    MCV 95.5 81.4 - 97.8 fL    MCH 30.3 27.0 - 33.0 pg    MCHC 31.7 (L) 33.6 - 35.0 g/dL    RDW 45.8 35.9 - 50.0 fL    Platelet Count 251 164 - 446 K/uL    MPV 8.4 (L) 9.0 - 12.9 fL    Neutrophils-Polys 47.70 44.00 - 72.00 %    Lymphocytes 41.30 (H) 22.00 - 41.00 %    Monocytes 7.70 0.00 - 13.40 %    Eosinophils 2.70 0.00 - 6.90 %    Basophils 0.60 0.00 - 1.80 %    Immature Granulocytes 0.00 0.00 - 0.90 %    Nucleated RBC 0.00 /100 WBC    Neutrophils (Absolute) 2.48 2.00 - 7.15 K/uL    Lymphs (Absolute) 2.15 1.00 - 4.80 K/uL    Monos (Absolute) 0.40 0.00 - 0.85 K/uL    Eos (Absolute) 0.14 0.00 - 0.51 K/uL    Baso (Absolute) 0.03 0.00 - 0.12 K/uL    Immature " Granulocytes (abs) 0.00 0.00 - 0.11 K/uL    NRBC (Absolute) 0.00 K/uL   COMP METABOLIC PANEL   Result Value Ref Range    Sodium 145 135 - 145 mmol/L    Potassium 3.8 3.6 - 5.5 mmol/L    Chloride 108 96 - 112 mmol/L    Co2 30 20 - 33 mmol/L    Anion Gap 7.0 0.0 - 11.9    Glucose 87 65 - 99 mg/dL    Bun 16 8 - 22 mg/dL    Creatinine 0.97 0.50 - 1.40 mg/dL    Calcium 8.9 8.5 - 10.5 mg/dL    AST(SGOT) 15 12 - 45 U/L    ALT(SGPT) 10 2 - 50 U/L    Alkaline Phosphatase 56 30 - 99 U/L    Total Bilirubin 0.3 0.1 - 1.5 mg/dL    Albumin 4.0 3.2 - 4.9 g/dL    Total Protein 7.1 6.0 - 8.2 g/dL    Globulin 3.1 1.9 - 3.5 g/dL    A-G Ratio 1.3 g/dL   LIPASE   Result Value Ref Range    Lipase 30 11 - 82 U/L   TROPONIN   Result Value Ref Range    Troponin T <6 6 - 19 ng/L   URINALYSIS CULTURE, IF INDICATED   Result Value Ref Range    Color Yellow     Character Clear     Specific Gravity 1.025 <1.035    Ph 7.0 5.0 - 8.0    Glucose Negative Negative mg/dL    Ketones Trace (A) Negative mg/dL    Protein Negative Negative mg/dL    Bilirubin Negative Negative    Urobilinogen, Urine 1.0 Negative    Nitrite Negative Negative    Leukocyte Esterase Negative Negative    Occult Blood Negative Negative    Micro Urine Req see below    TSH   Result Value Ref Range    TSH 0.780 0.380 - 5.330 uIU/mL   ESTIMATED GFR   Result Value Ref Range    GFR If African American >60 >60 mL/min/1.73 m 2    GFR If Non African American >60 >60 mL/min/1.73 m 2   EKG (NOW)   Result Value Ref Range    Report       [facility] Emergency Dept.    Test Date:  2019  Pt Name:    WILL RAMIREZ              Department: ER  MRN:        [id number]                      Room:       RD 02  Gender:     Female                       Technician: [id number]  :        1970                   Requested By:ER TRIAGE PROTOCOL  Order #:    [id number]                    Syd MD: ZEFERINO BARAHONA. AMD    Measurements  Intervals                                 Axis  Rate:       37                           P:          59  OK:         156                          QRS:        23  QRSD:       84                           T:          32  QT:         468  QTc:        367    Interpretive Statements  SINUS BRADYCARDIA  BORDERLINE LOW VOLTAGE IN FRONTAL LEADS  No previous ECG available for comparison    Electronically Signed On 2019 15:03:30 PDT by ZEFERINO BARAHONA. [facility]     EKG (NOW)   Result Value Ref Range    Report       [facility] Emergency Dept.    Test Date:  2019  Pt Name:    WILL RAMIREZ              Department: ER  MRN:        [id number]                      Room:        02  Gender:     Female                       Technician: [id number]  :        1970                   Requested By:ZEFERINO BARAHONA  Order #:    [id number]                    Reading MD: ZEFERINO BARAHONA. [facility]    Measurements  Intervals                                Axis  Rate:       37                           P:          72  OK:         152                          QRS:        25  QRSD:       83                           T:          32  QT:         506  QTc:        397    Interpretive Statements  Sinus bradycardia  Borderline low voltage, extremity leads  Compared to ECG 2019 10:08:28  No significant changes    Electronically Signed On 2019 15:03:40 PDT by ZEFERINO BARAHONA. [facility]        RADIOLOGY/PROCEDURES  CT-ABDOMEN-PELVIS WITH   Final Result      1.  Mild periportal edema, nonspecific.   2.  Otherwise unremarkable abdomen and pelvis.      EC-ECHOCARDIOGRAM COMPLETE W/O CONT    (Results Pending)         COURSE & MEDICAL DECISION MAKING  Pertinent Labs & Imaging studies reviewed. (See chart for details)      The patient presents with persistent abdominal discomfort since her last ED visit and now she has pain localized to the right lower quadrant.  She continues to have some mild dysuria.  Now she feels weak and dizzy.    EKG performed at triage showed  bradycardia.  She is brought back to room immediately placed on a monitor.  She is worked up with labs, EKG monitoring and her bradycardia persists.    Because of the persistent bradycardia I have consulted cardiology and they will see the patient.    Patient does have tenderness the right lower quadrant.  CT was performed this does not show any evidence of appendicitis.  She does not have a fever, or a white count here.  She has a normal-appearing appendix on the CT report.  There is unlikely she has appendicitis.    There is a chance that she could have Salmonella causing relative bradycardia.  I have ordered a stool culture this is pending.      The patient was given a liter of fluid because she is weak and dizzy and lightheaded.  Is unable to tolerate a liter of fluid orally because she has abdominal discomfort and nausea and diarrhea.  She is reassessed after this fluid and is improved.    Patient will be admitted to the hospitalist for symptomatic bradycardia and abdominal discomfort.  She is admitted in guarded condition         FINAL IMPRESSION  1. Generalized abdominal pain     2. Acute UTI     3. Bradycardia     4.  Symptomatic bradycardia    2.   3.         Electronically signed by: Zack Pang, 8/20/2019 10:38 AM

## 2019-08-20 NOTE — ED TRIAGE NOTES
Amb to triage, seen here 2 days ago & diagnosed w/ a UTI, taking medications as prescribed.  Pt told to return for worsening.  Pt states she is now having worsening RLQ pain & flank pain.  Pt reports she is feeling dizzy and generally weak.  HR 30-40's at triage, denies hx of this.

## 2019-08-21 ENCOUNTER — APPOINTMENT (OUTPATIENT)
Dept: RADIOLOGY | Facility: MEDICAL CENTER | Age: 49
DRG: 243 | End: 2019-08-21
Attending: NURSE PRACTITIONER
Payer: MEDICAID

## 2019-08-21 LAB
ALBUMIN SERPL BCP-MCNC: 3.3 G/DL (ref 3.2–4.9)
ALBUMIN/GLOB SERPL: 1.4 G/DL
ALP SERPL-CCNC: 49 U/L (ref 30–99)
ALT SERPL-CCNC: 12 U/L (ref 2–50)
ANION GAP SERPL CALC-SCNC: 4 MMOL/L (ref 0–11.9)
AST SERPL-CCNC: 15 U/L (ref 12–45)
BACTERIA UR CULT: NORMAL
BASOPHILS # BLD AUTO: 1 % (ref 0–1.8)
BASOPHILS # BLD: 0.06 K/UL (ref 0–0.12)
BILIRUB SERPL-MCNC: 0.2 MG/DL (ref 0.1–1.5)
BUN SERPL-MCNC: 15 MG/DL (ref 8–22)
CALCIUM SERPL-MCNC: 8 MG/DL (ref 8.5–10.5)
CHLORIDE SERPL-SCNC: 110 MMOL/L (ref 96–112)
CHOLEST SERPL-MCNC: 112 MG/DL (ref 100–199)
CO2 SERPL-SCNC: 27 MMOL/L (ref 20–33)
CORTIS SERPL-MCNC: 2.2 UG/DL (ref 0–23)
CREAT SERPL-MCNC: 0.77 MG/DL (ref 0.5–1.4)
EOSINOPHIL # BLD AUTO: 0.18 K/UL (ref 0–0.51)
EOSINOPHIL NFR BLD: 2.9 % (ref 0–6.9)
ERYTHROCYTE [DISTWIDTH] IN BLOOD BY AUTOMATED COUNT: 46.8 FL (ref 35.9–50)
GLOBULIN SER CALC-MCNC: 2.4 G/DL (ref 1.9–3.5)
GLUCOSE SERPL-MCNC: 96 MG/DL (ref 65–99)
HCT VFR BLD AUTO: 39 % (ref 37–47)
HDLC SERPL-MCNC: 32 MG/DL
HGB BLD-MCNC: 12.1 G/DL (ref 12–16)
IMM GRANULOCYTES # BLD AUTO: 0.02 K/UL (ref 0–0.11)
IMM GRANULOCYTES NFR BLD AUTO: 0.3 % (ref 0–0.9)
LDLC SERPL CALC-MCNC: 65 MG/DL
LYMPHOCYTES # BLD AUTO: 3.65 K/UL (ref 1–4.8)
LYMPHOCYTES NFR BLD: 58.9 % (ref 22–41)
MCH RBC QN AUTO: 30.4 PG (ref 27–33)
MCHC RBC AUTO-ENTMCNC: 31 G/DL (ref 33.6–35)
MCV RBC AUTO: 98 FL (ref 81.4–97.8)
MONOCYTES # BLD AUTO: 0.51 K/UL (ref 0–0.85)
MONOCYTES NFR BLD AUTO: 8.2 % (ref 0–13.4)
NEUTROPHILS # BLD AUTO: 1.78 K/UL (ref 2–7.15)
NEUTROPHILS NFR BLD: 28.7 % (ref 44–72)
NRBC # BLD AUTO: 0 K/UL
NRBC BLD-RTO: 0 /100 WBC
PLATELET # BLD AUTO: 193 K/UL (ref 164–446)
PMV BLD AUTO: 8.5 FL (ref 9–12.9)
POTASSIUM SERPL-SCNC: 4.1 MMOL/L (ref 3.6–5.5)
PROT SERPL-MCNC: 5.7 G/DL (ref 6–8.2)
RBC # BLD AUTO: 3.98 M/UL (ref 4.2–5.4)
SIGNIFICANT IND 70042: NORMAL
SITE SITE: NORMAL
SODIUM SERPL-SCNC: 141 MMOL/L (ref 135–145)
SOURCE SOURCE: NORMAL
T4 FREE SERPL-MCNC: 0.97 NG/DL (ref 0.53–1.43)
TRIGL SERPL-MCNC: 73 MG/DL (ref 0–149)
TROPONIN T SERPL-MCNC: <6 NG/L (ref 6–19)
WBC # BLD AUTO: 6.2 K/UL (ref 4.8–10.8)

## 2019-08-21 PROCEDURE — 700102 HCHG RX REV CODE 250 W/ 637 OVERRIDE(OP): Performed by: HOSPITALIST

## 2019-08-21 PROCEDURE — 700111 HCHG RX REV CODE 636 W/ 250 OVERRIDE (IP): Performed by: INTERNAL MEDICINE

## 2019-08-21 PROCEDURE — 700105 HCHG RX REV CODE 258: Performed by: HOSPITALIST

## 2019-08-21 PROCEDURE — 82533 TOTAL CORTISOL: CPT

## 2019-08-21 PROCEDURE — 99233 SBSQ HOSP IP/OBS HIGH 50: CPT | Performed by: INTERNAL MEDICINE

## 2019-08-21 PROCEDURE — 700101 HCHG RX REV CODE 250: Performed by: HOSPITALIST

## 2019-08-21 PROCEDURE — 770020 HCHG ROOM/CARE - TELE (206)

## 2019-08-21 PROCEDURE — 700102 HCHG RX REV CODE 250 W/ 637 OVERRIDE(OP): Performed by: INTERNAL MEDICINE

## 2019-08-21 PROCEDURE — 84484 ASSAY OF TROPONIN QUANT: CPT

## 2019-08-21 PROCEDURE — 700111 HCHG RX REV CODE 636 W/ 250 OVERRIDE (IP): Performed by: HOSPITALIST

## 2019-08-21 PROCEDURE — 84439 ASSAY OF FREE THYROXINE: CPT

## 2019-08-21 PROCEDURE — A9270 NON-COVERED ITEM OR SERVICE: HCPCS | Performed by: HOSPITALIST

## 2019-08-21 PROCEDURE — 87040 BLOOD CULTURE FOR BACTERIA: CPT | Mod: 91

## 2019-08-21 PROCEDURE — 93018 CV STRESS TEST I&R ONLY: CPT | Performed by: INTERNAL MEDICINE

## 2019-08-21 PROCEDURE — 80053 COMPREHEN METABOLIC PANEL: CPT

## 2019-08-21 PROCEDURE — 93005 ELECTROCARDIOGRAM TRACING: CPT | Performed by: HOSPITALIST

## 2019-08-21 PROCEDURE — 93017 CV STRESS TEST TRACING ONLY: CPT | Performed by: NURSE PRACTITIONER

## 2019-08-21 PROCEDURE — 80061 LIPID PANEL: CPT

## 2019-08-21 PROCEDURE — 36415 COLL VENOUS BLD VENIPUNCTURE: CPT

## 2019-08-21 PROCEDURE — 85025 COMPLETE CBC W/AUTO DIFF WBC: CPT

## 2019-08-21 PROCEDURE — A9270 NON-COVERED ITEM OR SERVICE: HCPCS | Performed by: INTERNAL MEDICINE

## 2019-08-21 RX ORDER — IBUPROFEN 800 MG/1
400 TABLET ORAL EVERY 6 HOURS PRN
Status: DISCONTINUED | OUTPATIENT
Start: 2019-08-21 | End: 2019-08-25 | Stop reason: HOSPADM

## 2019-08-21 RX ORDER — ONDANSETRON 2 MG/ML
4 INJECTION INTRAMUSCULAR; INTRAVENOUS EVERY 4 HOURS PRN
Status: DISCONTINUED | OUTPATIENT
Start: 2019-08-21 | End: 2019-08-25 | Stop reason: HOSPADM

## 2019-08-21 RX ADMIN — ONDANSETRON 4 MG: 2 INJECTION INTRAMUSCULAR; INTRAVENOUS at 08:52

## 2019-08-21 RX ADMIN — CEFTRIAXONE SODIUM 1 G: 1 INJECTION, POWDER, FOR SOLUTION INTRAMUSCULAR; INTRAVENOUS at 05:25

## 2019-08-21 RX ADMIN — ACETAMINOPHEN 650 MG: 325 TABLET ORAL at 08:38

## 2019-08-21 RX ADMIN — OXYCODONE HYDROCHLORIDE 5 MG: 5 TABLET ORAL at 13:28

## 2019-08-21 RX ADMIN — POTASSIUM CHLORIDE AND SODIUM CHLORIDE: 900; 150 INJECTION, SOLUTION INTRAVENOUS at 08:53

## 2019-08-21 RX ADMIN — IBUPROFEN 400 MG: 800 TABLET, FILM COATED ORAL at 18:19

## 2019-08-21 RX ADMIN — OXYCODONE HYDROCHLORIDE 5 MG: 5 TABLET ORAL at 05:24

## 2019-08-21 RX ADMIN — POTASSIUM CHLORIDE AND SODIUM CHLORIDE: 900; 150 INJECTION, SOLUTION INTRAVENOUS at 05:25

## 2019-08-21 RX ADMIN — OXYCODONE HYDROCHLORIDE 5 MG: 5 TABLET ORAL at 08:38

## 2019-08-21 RX ADMIN — OXYCODONE HYDROCHLORIDE 5 MG: 5 TABLET ORAL at 18:28

## 2019-08-21 ASSESSMENT — ENCOUNTER SYMPTOMS
FATIGUE: 1
DIZZINESS: 1
SENSORY CHANGE: 0
MEMORY LOSS: 0
MYALGIAS: 1
HEADACHES: 0
ABDOMINAL PAIN: 0
BACK PAIN: 0
FOCAL WEAKNESS: 0
SHORTNESS OF BREATH: 0
NAUSEA: 0
PALPITATIONS: 0
BLOOD IN STOOL: 0
NERVOUS/ANXIOUS: 0
VOMITING: 0
FLANK PAIN: 0
DIAPHORESIS: 0
WEAKNESS: 0
DIZZINESS: 0
COUGH: 0
SPEECH CHANGE: 0
CHEST TIGHTNESS: 0
FEVER: 0
CHILLS: 0
DEPRESSION: 0

## 2019-08-21 ASSESSMENT — COPD QUESTIONNAIRES
DO YOU EVER COUGH UP ANY MUCUS OR PHLEGM?: NO/ONLY WITH OCCASIONAL COLDS OR INFECTIONS
COPD SCREENING SCORE: 5
HAVE YOU SMOKED AT LEAST 100 CIGARETTES IN YOUR ENTIRE LIFE: YES
DURING THE PAST 4 WEEKS HOW MUCH DID YOU FEEL SHORT OF BREATH: SOME OF THE TIME

## 2019-08-21 NOTE — PROGRESS NOTES
[facility] Medicine Daily Progress Note    Date of Service  8/21/2019    Chief Complaint  48 y.o. female admitted 8/20/2019 with h/o bipolar disorder not currently on rx, recent UTI on abx, now admitted for symptomatic bradycardia and nausea.  Continue cardiac monitoring.    Hospital Course    48 y.o. female admitted 8/20/2019 with h/o bipolar disorder not currently on rx, recent UTI on abx, now admitted for symptomatic bradycardia and nausea.  Continue cardiac monitoring.      Interval Problem Update  Denies flank pain, afebrile  No dizziness now, reports intermittent dizziness with movement and generalized weakness    Consultants/Specialty  cardiology    Code Status  Full    Disposition  TBD  Plan for PM placement Friday  If cultures remain neg.  D/w cardiology    Review of Systems  Review of Systems   Constitutional: Negative for chills, diaphoresis, fever and malaise/fatigue.   HENT: Negative for congestion and hearing loss.    Respiratory: Negative for cough and shortness of breath.    Cardiovascular: Negative for chest pain, palpitations and leg swelling.   Gastrointestinal: Negative for abdominal pain, nausea and vomiting.   Genitourinary: Negative for dysuria, flank pain, frequency and urgency.   Musculoskeletal: Positive for myalgias. Negative for back pain and joint pain.   Neurological: Negative for dizziness, sensory change, speech change, focal weakness, weakness and headaches.   Psychiatric/Behavioral: Negative for depression and memory loss. The patient is not nervous/anxious.         Physical Exam  Temp:  [36.2 °C (97.2 °F)-36.7 °C (98.1 °F)] 36.2 °C (97.2 °F)  Pulse:  [34-51] 44  Resp:  [16-24] 16  BP: ()/(51-67) 103/67  SpO2:  [92 %-100 %] 98 %    Physical Exam   Constitutional: She is oriented to person, place, and time. She appears well-nourished. No distress.   HENT:   Head: Normocephalic and atraumatic.   Nose: Nose normal.   Eyes: Pupils are equal, round, and reactive to light. EOM are  normal. Right eye exhibits no discharge. Left eye exhibits no discharge.   Neck: Neck supple. No JVD present. No thyromegaly present.   Cardiovascular: Normal rate and intact distal pulses.   No murmur heard.  Pulmonary/Chest: Breath sounds normal. No respiratory distress. She has no wheezes.   Abdominal: Soft. Bowel sounds are normal. She exhibits no distension. There is no tenderness.   Musculoskeletal: She exhibits no edema or tenderness.   Neurological: She is alert and oriented to person, place, and time. No cranial nerve deficit. She exhibits normal muscle tone. Coordination normal.   Skin: Skin is warm and dry. No rash noted. She is not diaphoretic. No erythema. No pallor.   Psychiatric: She has a normal mood and affect. Her behavior is normal. Judgment and thought content normal.   Nursing note and vitals reviewed.      Fluids    Intake/Output Summary (Last 24 hours) at 8/21/2019 1358  Last data filed at 8/21/2019 0812  Gross per 24 hour   Intake --   Output 300 ml   Net -300 ml       Laboratory  Recent Labs     08/18/19  1545 08/20/19  1025 08/21/19  0400   WBC 7.6 5.2 6.2   RBC 4.62 4.69 3.98*   HEMOGLOBIN 14.3 14.2 12.1   HEMATOCRIT 44.5 44.8 39.0   MCV 96.3 95.5 98.0*   MCH 31.0 30.3 30.4   MCHC 32.1* 31.7* 31.0*   RDW 46.3 45.8 46.8   PLATELETCT 264 251 193   MPV 8.6* 8.4* 8.5*     Recent Labs     08/18/19  1402 08/20/19  1025 08/21/19  0400   SODIUM 139 145 141   POTASSIUM 3.9 3.8 4.1   CHLORIDE 106 108 110   CO2 27 30 27   GLUCOSE 95 87 96   BUN 13 16 15   CREATININE 0.92 0.97 0.77   CALCIUM 9.6 8.9 8.0*             Recent Labs     08/21/19  0400   TRIGLYCERIDE 73   HDL 32*   LDL 65       Imaging  Cardiac Stress Test Treadmill Only   Final Result      CT-ABDOMEN-PELVIS WITH   Final Result      1.  Mild periportal edema, nonspecific.   2.  Otherwise unremarkable abdomen and pelvis.      EC-ECHOCARDIOGRAM COMPLETE W/O CONT    (Results Pending)        Assessment/Plan  Bradycardia- (present on  admission)  Assessment & Plan  Sinus bradycardia, appears to be symptomatic,  Patient presyncopal and lightheaded  Cardiology evaluation  thyroid function, cortisol-within normal limits    8/21 echocardiogram-pending  -stress testing  - on ivf    Right lower quadrant abdominal pain- (present on admission)  Assessment & Plan  CT scan negative for pathology  Possibly still related to urinary tract infection    UTI (urinary tract infection)- (present on admission)  Assessment & Plan  Possibly partially treated, continue antibiotic therapy and observe    8/21 Rocephin, needs 3 day course  D/w cardiology, plan for PM placement    Tobacco abuse- (present on admission)  Assessment & Plan  Strongly advised to enroll in cessation    Chronic pain- (present on admission)  Assessment & Plan  With history of fibromyalgia, avoid narcotics    Bipolar affective disorder in remission (HCC)- (present on admission)  Assessment & Plan  History of medication use, she reports recently none       VTE prophylaxis: Lovenox

## 2019-08-21 NOTE — PROGRESS NOTES
Bedside report received. Assumed care of pt. Pt sitting up in bed, A&O X4. No signs of distress, no complaints of pain at this time. Tele box on.Call light and belongings within reach. Bed locked and in lowest position.

## 2019-08-21 NOTE — PROGRESS NOTES
Cardiology Follow Up Progress Note    Date of Service  8/21/2019    Attending Physician  Jaycee Westfall D.O.    Chief Complaint   Dizziness, fatigue, abdominal and flank pain.    Consulted to evaluate bradycardia.    MAGGIE Lantigua is a 48 y.o. female admitted 8/20/2019 with complaints of abdominal pain, flank pain and dizziness.  Patient was recently diagnosed with UTI and was given antibiotic therapy however continued to not feel well.  Patient was noted to have significant bradycardia down to the mid 30s in the emergency room.    Past medical history significant for tobacco use.    Interim Events  8/21/19: Patient states that she is feeling slightly better this morning.  She only has dizziness with position change and resolves within 30 seconds to a minute and then she feels steady on her feet.  Patient is in agreement to proceed with treadmill stress testing to evaluate chronotropic competence today.  No significant events overnight per nursing.    Monitor: SB/SR 33-79 without ectopy.    Review of Systems  Review of Systems   Constitutional: Positive for fatigue. Negative for fever.   Respiratory: Negative for chest tightness and shortness of breath.    Cardiovascular: Negative for chest pain, palpitations and leg swelling.   Gastrointestinal: Negative for abdominal pain and blood in stool.   Genitourinary: Positive for dysuria (Improving). Negative for hematuria.   Musculoskeletal: Negative for gait problem.   Neurological: Positive for dizziness. Negative for syncope.   All other systems reviewed and are negative.      Vital signs in last 24 hours  Temp:  [36.2 °C (97.2 °F)-36.7 °C (98.1 °F)] 36.2 °C (97.2 °F)  Pulse:  [34-51] 34  Resp:  [13-24] 16  BP: ()/(47-64) 114/56  SpO2:  [92 %-100 %] 94 %    Physical Exam  Physical Exam   Constitutional: She is oriented to person, place, and time. She appears well-developed and well-nourished.   HENT:   Head: Normocephalic.   Eyes: EOM are normal.   Neck: No  JVD present.   Cardiovascular: Regular rhythm, normal heart sounds and intact distal pulses. Bradycardia present.   Pulmonary/Chest: Effort normal and breath sounds normal.   Musculoskeletal: Normal range of motion. She exhibits no edema.   Neurological: She is alert and oriented to person, place, and time.   Skin: Skin is warm and dry.   Psychiatric: She has a normal mood and affect. Her behavior is normal. Thought content normal.   Nursing note and vitals reviewed.      Lab Review  Lab Results   Component Value Date/Time    WBC 6.2 08/21/2019 04:00 AM    RBC 3.98 (L) 08/21/2019 04:00 AM    HEMOGLOBIN 12.1 08/21/2019 04:00 AM    HEMATOCRIT 39.0 08/21/2019 04:00 AM    MCV 98.0 (H) 08/21/2019 04:00 AM    MCH 30.4 08/21/2019 04:00 AM    MCHC 31.0 (L) 08/21/2019 04:00 AM    MPV 8.5 (L) 08/21/2019 04:00 AM      Lab Results   Component Value Date/Time    SODIUM 141 08/21/2019 04:00 AM    POTASSIUM 4.1 08/21/2019 04:00 AM    CHLORIDE 110 08/21/2019 04:00 AM    CO2 27 08/21/2019 04:00 AM    GLUCOSE 96 08/21/2019 04:00 AM    BUN 15 08/21/2019 04:00 AM    CREATININE 0.77 08/21/2019 04:00 AM      Lab Results   Component Value Date/Time    ASTSGOT 15 08/21/2019 04:00 AM    ALTSGPT 12 08/21/2019 04:00 AM     Lab Results   Component Value Date/Time    CHOLSTRLTOT 112 08/21/2019 04:00 AM    LDL 65 08/21/2019 04:00 AM    HDL 32 (A) 08/21/2019 04:00 AM    TRIGLYCERIDE 73 08/21/2019 04:00 AM    TROPONINT <6 08/20/2019 10:25 AM       No results for input(s): NTPROBNP in the last 72 hours.    Assessment/Plan  Bradycardia:  -Symptomatic with dizziness and fatigue, however this is also in the setting of UTI.  -Treadmill stress test today to evaluate chronotropic competence.  -Normal thyroid lab work.   -Avoid AV taj blockers.  -Echocardiogram pending.    UTI:  -Receiving IV Rocephin.  -Primary following.    Thank you for allowing us to participate in the care of this patient.  Please us if you have any questions or  concerns.    EVAN Dow.   [facility]  [phone]

## 2019-08-21 NOTE — HEART FAILURE PROGRAM
"Chart reviewed for AMI and HF quality measures. Neither diagnosis applicable at this time, patient is being followed by cardiology for bradycardia.     Please place a \"Consult Nurse Navigator\" order (can be found in the HF order set) should AMI or HF become an active diagnosis.    Sushila YOO RN, [employer] ext. [phone] M-F        "

## 2019-08-21 NOTE — PROGRESS NOTES
Paged on call hospitalist regarding pt bradying down to 34-38, Dr. Alexandra returned call, stated to have an EKG done if she goes to 30's.

## 2019-08-21 NOTE — H&P
Hospital Medicine History & Physical Note    Date of Service  8/20/2019    Primary Care Physician  Pcp Pt States None    Consultants  Cardiology    Code Status  Full code    Chief Complaint  Weakness, abdominal pain, dizziness    History of Presenting Illness  48 y.o. female who presented 8/20/2019 with complaints of abdominal pain, flank pain, dizziness, recently seen in the emergency room for abdominal pain and diagnosed with a urine tract infection.  The patient feels not improved after she is taking her medication at home including antibiotics, she denies any other medication intake.  She states that she had initially nausea vomiting diarrhea and abdominal pain initially on the left side of her abdomen, this is now radiated to the right side, the patient is nausea and diarrhea has improved.  She does still have some dysuria, she feels now more weak and dizzy.  Lightheaded at times with presyncope.  She denies prior cardiology difficulties or evaluations.  The patient is seen in the emergency room and has a significant bradycardia down to the mid 30s, she states that she had a prior syncopal work-up but does not remember details.  She used to be on psychiatric medications but not recently.  She states that she has lost significant amount of weight and is chronically fatigued and tired.  Cardiology has been consulted from the emergency room    Review of Systems  Review of Systems   Constitutional: Positive for malaise/fatigue and weight loss.        Weight loss, fatigue   Gastrointestinal: Positive for abdominal pain.   Genitourinary: Positive for dysuria.   Musculoskeletal: Positive for back pain and neck pain.   Neurological: Positive for dizziness.       Past Medical History   has a past medical history of Asthma, Bipolar 2 disorder, major depressive episode (HCC), Fibromyalgia (2010), Lumbago, Major depression (1996), Psychiatric disorder, Psychiatric disorder, and PTSD (post-traumatic stress  "disorder).    Surgical History   has a past surgical history that includes primary c section and other.     Family History  family history includes Alzheimer's Disease in her maternal grandmother; Bipolar disorder in her father; Cancer in her paternal grandfather and paternal grandmother; Cancer (age of onset: 43) in her mother.     Social History   reports that she has quit smoking. Her smoking use included cigarettes. She has a 15.00 pack-year smoking history. She has never used smokeless tobacco. She reports that she does not drink alcohol or use drugs.    Allergies  Allergies   Allergen Reactions   • Doxycycline Shortness of Breath     \"Chest pain\"       Medications  Prior to Admission Medications   Prescriptions Last Dose Informant Patient Reported? Taking?   cefdinir (OMNICEF) 300 MG Cap 8/20/2019 at 0630 Patient No No   Sig: Take 1 Cap by mouth 2 times a day.   ondansetron (ZOFRAN ODT) 4 MG TABLET DISPERSIBLE 8/20/2019 at 0800 Patient No No   Sig: Take 1 Tab by mouth every 8 hours as needed.   oxyCODONE-acetaminophen (PERCOCET) 5-325 MG Tab 8/20/2019 at 0630 Patient No No   Sig: Take 1-2 Tabs by mouth every 6 hours as needed (moderate to severe pain) for up to 3 days.      Facility-Administered Medications: None       Physical Exam  Temp:  [36.4 °C (97.6 °F)-36.6 °C (97.9 °F)] 36.4 °C (97.6 °F)  Pulse:  [36-51] 51  Resp:  [13-24] 16  BP: ()/(47-64) 109/61  SpO2:  [92 %-100 %] 100 %    Physical Exam   Constitutional: She is oriented to person, place, and time. She appears well-developed and well-nourished.   HENT:   Head: Normocephalic and atraumatic.   Nose: Nose normal.   Mouth/Throat: Oropharynx is clear and moist.   Eyes: Pupils are equal, round, and reactive to light. Conjunctivae and EOM are normal.   Neck: Normal range of motion. Neck supple. No JVD present. No thyromegaly present.   Cardiovascular: Regular rhythm, normal heart sounds and intact distal pulses. Bradycardia present. Exam reveals no " gallop and no friction rub.   Capillary refill <3 secs   Pulmonary/Chest: Effort normal and breath sounds normal. She has no wheezes. She has no rales.   Abdominal: Soft. Bowel sounds are normal. She exhibits no distension and no mass. There is no tenderness. There is no rebound and no guarding.   Musculoskeletal: Normal range of motion. She exhibits no edema or tenderness.   Lymphadenopathy:     She has no cervical adenopathy.   Neurological: She is alert and oriented to person, place, and time. No cranial nerve deficit.   Skin: Skin is warm and dry. She is not diaphoretic. No cyanosis.   Psychiatric: She has a normal mood and affect. Her behavior is normal.   Nursing note and vitals reviewed.      Laboratory:  Recent Labs     08/18/19  1545 08/20/19  1025   WBC 7.6 5.2   RBC 4.62 4.69   HEMOGLOBIN 14.3 14.2   HEMATOCRIT 44.5 44.8   MCV 96.3 95.5   MCH 31.0 30.3   MCHC 32.1* 31.7*   RDW 46.3 45.8   PLATELETCT 264 251   MPV 8.6* 8.4*     Recent Labs     08/18/19  1402 08/20/19  1025   SODIUM 139 145   POTASSIUM 3.9 3.8   CHLORIDE 106 108   CO2 27 30   GLUCOSE 95 87   BUN 13 16   CREATININE 0.92 0.97   CALCIUM 9.6 8.9     Recent Labs     08/18/19  1402 08/20/19  1025   ALTSGPT 10 10   ASTSGOT 17 15   ALKPHOSPHAT 61 56   TBILIRUBIN 0.5 0.3   LIPASE 19 30   GLUCOSE 95 87         No results for input(s): NTPROBNP in the last 72 hours.      Recent Labs     08/20/19  1025   TROPONINT <6       Urinalysis:    Recent Labs     08/18/19  1555 08/20/19  1212   SPECGRAVITY 1.022 1.025   GLUCOSEUR Negative Negative   KETONES Negative Trace*   NITRITE Negative Negative   LEUKESTERAS Moderate* Negative   WBCURINE 20-50*  --    RBCURINE 0-2  --    BACTERIA Many*  --    EPITHELCELL Many*  --         Imaging:  CT-ABDOMEN-PELVIS WITH   Final Result      1.  Mild periportal edema, nonspecific.   2.  Otherwise unremarkable abdomen and pelvis.      EC-ECHOCARDIOGRAM COMPLETE W/O CONT    (Results Pending)         Assessment/Plan:  I  anticipate this patient will require at least two midnights for appropriate medical management, necessitating inpatient admission.    Bradycardia- (present on admission)  Assessment & Plan  Sinus bradycardia, appears to be symptomatic,  Patient presyncopal and lightheaded  Cardiology evaluation  Laboratory work-up including thyroid function, cortisol, echocardiogram  Possible stress testing    Right lower quadrant abdominal pain- (present on admission)  Assessment & Plan  CT scan negative for pathology  Possibly still related to urinary tract infection    UTI (urinary tract infection)- (present on admission)  Assessment & Plan  Possibly partially treated, continue antibiotic therapy and observe      Tobacco abuse- (present on admission)  Assessment & Plan  Strongly advised to enroll in cessation    Chronic pain- (present on admission)  Assessment & Plan  With history of fibromyalgia, avoid narcotics    Bipolar affective disorder in remission (HCC)- (present on admission)  Assessment & Plan  History of medication use, she reports recently none    Plan  The patient presents with malaise, abdominal pain and symptomatic bradycardia which is quite severe  The patient had significant risk of further symptoms, syncope and injury  Will require cardiology evaluation and addressing the cause of her symptomatic significant bradycardia  Continue IV antibiotics for cystitis  Patient likely requiring 2+ overnight stay in the hospital given her severity of symptoms and complexity of presentation  VTE prophylaxis: Lovenox

## 2019-08-21 NOTE — CARE PLAN
Problem: Communication  Goal: The ability to communicate needs accurately and effectively will improve  Intervention: Educate patient and significant other/support system about the plan of care, procedures, treatments, medications and allow for questions  Note:   Pt educated on plan of care and treatments for the day.      Problem: Safety  Goal: Will remain free from falls  Intervention: Implement fall precautions  Note:   Pt educated to call for assistance.

## 2019-08-21 NOTE — PROGRESS NOTES
Assumed care of pt. Bedside report completed with night shift RN. Pt alert and oriented. Pt denies chest pain for this RN. Pt resting comfortably in bed. Call light within reach. Bed low and locked. Offered needs.

## 2019-08-21 NOTE — ASSESSMENT & PLAN NOTE
Sinus bradycardia, appears to be symptomatic,  Patient presyncopal and lightheaded  Cardiology evaluation  thyroid function, cortisol-within normal limits    8/21 echocardiogram-pending  -stress testing  - on ivf    8/22 symptomatic bradycardia requiring pacemaker placement  Per cardiology plan for placement this Friday  Follow-up blood cultures, currently negative    8/23 n.p.o. for pacemaker placement today  Denies dizziness  Blood cultures remain negative  Status post 3 days antibiotics    8/24  Status post pacemaker placement  Continue cardiac monitoring

## 2019-08-21 NOTE — ASSESSMENT & PLAN NOTE
Possibly partially treated, continue antibiotic therapy and observe    8/21 Rocephin, needs 3 day course  D/w cardiology, plan for PM placement    8/22 blood cultures from the 21st remain negative  On Rocephin  Improving dysuria  Denies flank pain    8/23 status post antibiotics x3 days  Blood cultures negative  Urine culture negative  Partial treatment prior to this admission

## 2019-08-22 ENCOUNTER — APPOINTMENT (OUTPATIENT)
Dept: CARDIOLOGY | Facility: MEDICAL CENTER | Age: 49
DRG: 243 | End: 2019-08-22
Attending: INTERNAL MEDICINE
Payer: MEDICAID

## 2019-08-22 LAB
ANION GAP SERPL CALC-SCNC: 3 MMOL/L (ref 0–11.9)
BASOPHILS # BLD AUTO: 0.9 % (ref 0–1.8)
BASOPHILS # BLD: 0.06 K/UL (ref 0–0.12)
BUN SERPL-MCNC: 12 MG/DL (ref 8–22)
CALCIUM SERPL-MCNC: 7.9 MG/DL (ref 8.5–10.5)
CHLORIDE SERPL-SCNC: 110 MMOL/L (ref 96–112)
CO2 SERPL-SCNC: 27 MMOL/L (ref 20–33)
CREAT SERPL-MCNC: 0.8 MG/DL (ref 0.5–1.4)
EKG IMPRESSION: NORMAL
EKG IMPRESSION: NORMAL
EOSINOPHIL # BLD AUTO: 0.28 K/UL (ref 0–0.51)
EOSINOPHIL NFR BLD: 4 % (ref 0–6.9)
ERYTHROCYTE [DISTWIDTH] IN BLOOD BY AUTOMATED COUNT: 47.3 FL (ref 35.9–50)
GLUCOSE SERPL-MCNC: 95 MG/DL (ref 65–99)
HCT VFR BLD AUTO: 37.5 % (ref 37–47)
HGB BLD-MCNC: 11.7 G/DL (ref 12–16)
IMM GRANULOCYTES # BLD AUTO: 0.02 K/UL (ref 0–0.11)
IMM GRANULOCYTES NFR BLD AUTO: 0.3 % (ref 0–0.9)
LV EJECT FRACT  99904: 55
LV EJECT FRACT MOD 2C 99903: 59.41
LV EJECT FRACT MOD 4C 99902: 64.16
LV EJECT FRACT MOD BP 99901: 58.76
LYMPHOCYTES # BLD AUTO: 3.64 K/UL (ref 1–4.8)
LYMPHOCYTES NFR BLD: 51.8 % (ref 22–41)
MCH RBC QN AUTO: 30.4 PG (ref 27–33)
MCHC RBC AUTO-ENTMCNC: 31.2 G/DL (ref 33.6–35)
MCV RBC AUTO: 97.4 FL (ref 81.4–97.8)
MONOCYTES # BLD AUTO: 0.56 K/UL (ref 0–0.85)
MONOCYTES NFR BLD AUTO: 8 % (ref 0–13.4)
NEUTROPHILS # BLD AUTO: 2.47 K/UL (ref 2–7.15)
NEUTROPHILS NFR BLD: 35 % (ref 44–72)
NRBC # BLD AUTO: 0 K/UL
NRBC BLD-RTO: 0 /100 WBC
PLATELET # BLD AUTO: 221 K/UL (ref 164–446)
PMV BLD AUTO: 8.9 FL (ref 9–12.9)
POTASSIUM SERPL-SCNC: 4.3 MMOL/L (ref 3.6–5.5)
RBC # BLD AUTO: 3.85 M/UL (ref 4.2–5.4)
SODIUM SERPL-SCNC: 140 MMOL/L (ref 135–145)
WBC # BLD AUTO: 7 K/UL (ref 4.8–10.8)

## 2019-08-22 PROCEDURE — 80048 BASIC METABOLIC PNL TOTAL CA: CPT

## 2019-08-22 PROCEDURE — 93010 ELECTROCARDIOGRAM REPORT: CPT | Mod: 76 | Performed by: INTERNAL MEDICINE

## 2019-08-22 PROCEDURE — 99231 SBSQ HOSP IP/OBS SF/LOW 25: CPT | Performed by: INTERNAL MEDICINE

## 2019-08-22 PROCEDURE — 93306 TTE W/DOPPLER COMPLETE: CPT

## 2019-08-22 PROCEDURE — 93306 TTE W/DOPPLER COMPLETE: CPT | Mod: 26 | Performed by: INTERNAL MEDICINE

## 2019-08-22 PROCEDURE — 700102 HCHG RX REV CODE 250 W/ 637 OVERRIDE(OP): Performed by: INTERNAL MEDICINE

## 2019-08-22 PROCEDURE — 99233 SBSQ HOSP IP/OBS HIGH 50: CPT | Performed by: INTERNAL MEDICINE

## 2019-08-22 PROCEDURE — A9270 NON-COVERED ITEM OR SERVICE: HCPCS | Performed by: INTERNAL MEDICINE

## 2019-08-22 PROCEDURE — 700105 HCHG RX REV CODE 258: Performed by: HOSPITALIST

## 2019-08-22 PROCEDURE — 85025 COMPLETE CBC W/AUTO DIFF WBC: CPT

## 2019-08-22 PROCEDURE — 93005 ELECTROCARDIOGRAM TRACING: CPT | Performed by: INTERNAL MEDICINE

## 2019-08-22 PROCEDURE — 36415 COLL VENOUS BLD VENIPUNCTURE: CPT

## 2019-08-22 PROCEDURE — 700101 HCHG RX REV CODE 250: Performed by: HOSPITALIST

## 2019-08-22 PROCEDURE — 93010 ELECTROCARDIOGRAM REPORT: CPT | Performed by: INTERNAL MEDICINE

## 2019-08-22 PROCEDURE — 700102 HCHG RX REV CODE 250 W/ 637 OVERRIDE(OP): Performed by: HOSPITALIST

## 2019-08-22 PROCEDURE — A9270 NON-COVERED ITEM OR SERVICE: HCPCS | Performed by: HOSPITALIST

## 2019-08-22 PROCEDURE — 700111 HCHG RX REV CODE 636 W/ 250 OVERRIDE (IP): Performed by: HOSPITALIST

## 2019-08-22 PROCEDURE — 770020 HCHG ROOM/CARE - TELE (206)

## 2019-08-22 RX ADMIN — SENNOSIDES, DOCUSATE SODIUM 2 TABLET: 50; 8.6 TABLET, FILM COATED ORAL at 05:30

## 2019-08-22 RX ADMIN — POLYETHYLENE GLYCOL 3350 1 PACKET: 17 POWDER, FOR SOLUTION ORAL at 19:58

## 2019-08-22 RX ADMIN — NICOTINE POLACRILEX 2 MG: 2 GUM, CHEWING BUCCAL at 05:30

## 2019-08-22 RX ADMIN — IBUPROFEN 400 MG: 800 TABLET, FILM COATED ORAL at 18:00

## 2019-08-22 RX ADMIN — POTASSIUM CHLORIDE AND SODIUM CHLORIDE: 900; 150 INJECTION, SOLUTION INTRAVENOUS at 05:31

## 2019-08-22 RX ADMIN — ENOXAPARIN SODIUM 40 MG: 100 INJECTION SUBCUTANEOUS at 05:30

## 2019-08-22 RX ADMIN — MAGNESIUM HYDROXIDE 30 ML: 400 SUSPENSION ORAL at 19:58

## 2019-08-22 RX ADMIN — CEFTRIAXONE SODIUM 1 G: 1 INJECTION, POWDER, FOR SOLUTION INTRAMUSCULAR; INTRAVENOUS at 05:30

## 2019-08-22 RX ADMIN — OXYCODONE HYDROCHLORIDE 5 MG: 5 TABLET ORAL at 03:28

## 2019-08-22 RX ADMIN — IBUPROFEN 400 MG: 800 TABLET, FILM COATED ORAL at 08:48

## 2019-08-22 RX ADMIN — POLYETHYLENE GLYCOL 3350 1 PACKET: 17 POWDER, FOR SOLUTION ORAL at 05:30

## 2019-08-22 RX ADMIN — IBUPROFEN 400 MG: 800 TABLET, FILM COATED ORAL at 00:18

## 2019-08-22 RX ADMIN — OXYCODONE HYDROCHLORIDE 5 MG: 5 TABLET ORAL at 00:17

## 2019-08-22 ASSESSMENT — ENCOUNTER SYMPTOMS
STRIDOR: 0
CHILLS: 0
HEADACHES: 0
DEPRESSION: 0
APNEA: 0
MYALGIAS: 1
FLANK PAIN: 0
WHEEZING: 0
ABDOMINAL PAIN: 0
CHEST TIGHTNESS: 0
NERVOUS/ANXIOUS: 0
FEVER: 0
NAUSEA: 0
PALPITATIONS: 0
MEMORY LOSS: 0
WEAKNESS: 0
BACK PAIN: 0
SHORTNESS OF BREATH: 0
COUGH: 0
FOCAL WEAKNESS: 0
DIAPHORESIS: 0
CHOKING: 0

## 2019-08-22 NOTE — PROGRESS NOTES
Hospital Medicine Daily Progress Note    Date of Service  8/22/2019    Chief Complaint  48 y.o. female admitted 8/20/2019 with h/o bipolar disorder not currently on rx, recent UTI on abx, now admitted for symptomatic bradycardia and nausea.  Continue cardiac monitoring.    Hospital Course    48 y.o. female admitted 8/20/2019 with h/o bipolar disorder not currently on rx, recent UTI on abx, now admitted for symptomatic bradycardia and nausea.  Continue cardiac monitoring.      Interval Problem Update  Reports minimal dysuria  Feels stronger  Dizziness resolved  Plan for pacemaker placement on Friday    Consultants/Specialty  cardiology    Code Status  Full    Disposition  TBD  Plan for PM placement Friday  If cultures remain neg.  D/w cardiology    Review of Systems  Review of Systems   Constitutional: Negative for chills, diaphoresis and fever.   HENT: Negative for congestion.    Respiratory: Negative for shortness of breath.    Cardiovascular: Negative for palpitations and leg swelling.   Gastrointestinal: Negative for abdominal pain and nausea.   Genitourinary: Negative for dysuria, flank pain, frequency and urgency.   Musculoskeletal: Positive for myalgias. Negative for back pain.   Neurological: Negative for focal weakness, weakness and headaches.   Psychiatric/Behavioral: Negative for depression and memory loss. The patient is not nervous/anxious.         Physical Exam  Temp:  [36.2 °C (97.2 °F)-36.7 °C (98 °F)] 36.7 °C (98 °F)  Pulse:  [40-60] 60  Resp:  [16-18] 18  BP: (103-119)/(54-70) 108/70  SpO2:  [96 %-100 %] 98 %    Physical Exam   Constitutional: She is oriented to person, place, and time. She appears well-nourished. No distress.   HENT:   Head: Normocephalic and atraumatic.   Nose: Nose normal.   Eyes: Pupils are equal, round, and reactive to light. EOM are normal.   Neck: Neck supple. No thyromegaly present.   Cardiovascular: Normal rate and intact distal pulses.   Pulmonary/Chest: Breath sounds  normal. No respiratory distress. She has no wheezes. She exhibits no tenderness.   Abdominal: Soft. Bowel sounds are normal. She exhibits no distension.   Musculoskeletal: She exhibits no edema or tenderness.   Neurological: She is alert and oriented to person, place, and time. No cranial nerve deficit. Coordination normal.   Skin: Skin is warm and dry. No pallor.   Psychiatric: She has a normal mood and affect. Her behavior is normal. Judgment and thought content normal.   Nursing note and vitals reviewed.      Fluids    Intake/Output Summary (Last 24 hours) at 8/22/2019 1125  Last data filed at 8/22/2019 0531  Gross per 24 hour   Intake 480 ml   Output 200 ml   Net 280 ml       Laboratory  Recent Labs     08/20/19  1025 08/21/19  0400 08/22/19  0320   WBC 5.2 6.2 7.0   RBC 4.69 3.98* 3.85*   HEMOGLOBIN 14.2 12.1 11.7*   HEMATOCRIT 44.8 39.0 37.5   MCV 95.5 98.0* 97.4   MCH 30.3 30.4 30.4   MCHC 31.7* 31.0* 31.2*   RDW 45.8 46.8 47.3   PLATELETCT 251 193 221   MPV 8.4* 8.5* 8.9*     Recent Labs     08/20/19  1025 08/21/19  0400 08/22/19  0320   SODIUM 145 141 140   POTASSIUM 3.8 4.1 4.3   CHLORIDE 108 110 110   CO2 30 27 27   GLUCOSE 87 96 95   BUN 16 15 12   CREATININE 0.97 0.77 0.80   CALCIUM 8.9 8.0* 7.9*             Recent Labs     08/21/19  0400   TRIGLYCERIDE 73   HDL 32*   LDL 65       Imaging  Cardiac Stress Test Treadmill Only   Final Result      CT-ABDOMEN-PELVIS WITH   Final Result      1.  Mild periportal edema, nonspecific.   2.  Otherwise unremarkable abdomen and pelvis.      EC-ECHOCARDIOGRAM COMPLETE W/O CONT    (Results Pending)        Assessment/Plan  Bradycardia- (present on admission)  Assessment & Plan  Sinus bradycardia, appears to be symptomatic,  Patient presyncopal and lightheaded  Cardiology evaluation  thyroid function, cortisol-within normal limits    8/21 echocardiogram-pending  -stress testing  - on ivf    8/22 somatic bradycardia requiring pacemaker placement  Per cardiology plan for  placement this Friday  Follow-up blood cultures, currently negative    Right lower quadrant abdominal pain- (present on admission)  Assessment & Plan  CT scan negative for pathology  Possibly still related to urinary tract infection    UTI (urinary tract infection)- (present on admission)  Assessment & Plan  Possibly partially treated, continue antibiotic therapy and observe    8/21 Rocephin, needs 3 day course  D/w cardiology, plan for PM placement    8/22 blood cultures from the 21st remain negative  On Rocephin  Improving dysuria  Denies flank pain    Tobacco abuse- (present on admission)  Assessment & Plan  Strongly advised to enroll in cessation    Chronic pain- (present on admission)  Assessment & Plan  With history of fibromyalgia, avoid narcotics    Bipolar affective disorder in remission (HCC)- (present on admission)  Assessment & Plan  History of medication use, she reports recently none       VTE prophylaxis: Lovenox

## 2019-08-22 NOTE — PROGRESS NOTES
Cardiology Follow Up Progress Note    Date of Service  8/22/2019    Attending Physician  Jaycee Westfall D.O.    Chief Complaint   Profound symptomatic bradycardia    HPI  Janina Lantigua is a 48 y.o. female admitted 8/20/2019 with symptomatic bradycardia (dizziness/presyncope, nausea.)    History of recent UTI/pyelonephritis, was started on antibiotics, bipolar disorder not currently on treatment, syncope 20 years ago work-up was unremarkable, asthma, fibromyalgia, tobacco abuse smokes a pack every 2 to 3 days.    Interim Events  8/24/19 no overnight cardiac issues, paced rhythm, device functioning well, chest x-ray post device implantation demonstrated no pneumothorax, site C/D/I, severe headache this morning with right-sided chest pain, no acute changes on EKG received Dilaudid, groggy currently but denies chest discomfort.    8/23/19 successful implantation of dual-chamber pacemaker, [employer].    Review of Systems  Review of Systems   Respiratory: Negative for apnea, cough, choking, chest tightness, shortness of breath, wheezing and stridor.    Cardiovascular: Negative for chest pain and leg swelling.       Vital signs in last 24 hours  Temp:  [36.2 °C (97.2 °F)-36.7 °C (98 °F)] 36.7 °C (98 °F)  Pulse:  [40-60] 60  Resp:  [16-18] 18  BP: (103-119)/(54-70) 108/70  SpO2:  [96 %-100 %] 98 %    Physical Exam  Physical Exam   Constitutional: She is oriented to person, place, and time. She appears well-developed.   Eyes: Conjunctivae are normal.   Neck: No JVD present. No thyromegaly present.   Cardiovascular:   Pulses:       Carotid pulses are 2+ on the right side, and 2+ on the left side.       Radial pulses are 2+ on the right side, and 2+ on the left side.   Paced   Pulmonary/Chest: She has no wheezes.   Abdominal: Soft.   Musculoskeletal: She exhibits no edema.   Neurological: She is alert and oriented to person, place, and time.   Skin: Skin is warm and dry.   Pacer site without evidence of hematoma, no edema        Lab Review  Lab Results   Component Value Date/Time    WBC 7.0 2019 03:20 AM    RBC 3.85 (L) 2019 03:20 AM    HEMOGLOBIN 11.7 (L) 2019 03:20 AM    HEMATOCRIT 37.5 2019 03:20 AM    MCV 97.4 2019 03:20 AM    MCH 30.4 2019 03:20 AM    MCHC 31.2 (L) 2019 03:20 AM    MPV 8.9 (L) 2019 03:20 AM      Lab Results   Component Value Date/Time    SODIUM 140 2019 03:20 AM    POTASSIUM 4.3 2019 03:20 AM    CHLORIDE 110 2019 03:20 AM    CO2 27 2019 03:20 AM    GLUCOSE 95 2019 03:20 AM    BUN 12 2019 03:20 AM    CREATININE 0.80 2019 03:20 AM      Lab Results   Component Value Date/Time    ASTSGOT 15 2019 04:00 AM    ALTSGPT 12 2019 04:00 AM     Lab Results   Component Value Date/Time    CHOLSTRLTOT 112 2019 04:00 AM    LDL 65 2019 04:00 AM    HDL 32 (A) 2019 04:00 AM    TRIGLYCERIDE 73 2019 04:00 AM    TROPONINT <6 2019 04:00 AM       No results for input(s): NTPROBNP in the last 72 hours.    Cardiac Imaging and Procedures Review  EK19 atrially paced rhythm    Echocardiogram: 19    No prior study is available for comparison.   Normal left ventricular systolic function.  Left ventricular ejection fraction is visually estimated to be 55%.  Normal diastolic function.  Normal inferior vena cava size and inspiratory collapse.  Estimated right ventricular systolic pressure is 28 mmHg.              Imaging  Chest X-Ray: 19 no pneumothorax    Stress Test: 19    Nondiagnostic due to failure to achieve target heart rate.   No evidence for ischemia at workload achieved.    Assessment/Plan    Profound bradycardia  Underwent successful implantation of dual-chamber pacemaker, [employer], 19  Chest x-ray postop 19, no pneumothorax  Device was interrogated 19, functioning well  Tobacco abuse, reinforced abstinence from tobacco  Recent UTI/pyelonephritis, completed course of  antibiotics, cultures NGTD  Echo showed EF 55%      Encouraged ambulation  Headache and sharp right-sided chest discomfort this am, noncardiac, appreciate primary team input.  No further cardiac work-up  Cardiology will sign off  Appointment with our device clinic in 7 days, will arrange.        PPM restrictions reviewed with patient. Do not raise affected arm above head or behind back for six weeks. May remove arm sling after 24 hrs, please wear if trouble remembering arm limitations and prn at night. No heavy lifting/pushing for six weeks. No driving for first week. No showers first week. Keep dressing on, clean, and dry until sees us in follow up. Wound care reviewed. Instructed to report s/s of infection such as warmth/redness/drainage/swelling at site or fever/chills.         Please contact me with any questions.    AARON Pantoja.   Cardiologist, [facility]  [phone]

## 2019-08-22 NOTE — PROGRESS NOTES
Cardiology Follow Up Progress Note    Date of Service  8/22/2019    Attending Physician  Jyacee Westfall D.O.    Chief Complaint   Dizziness/presyncope , EKG showed profound bradycardia, rate 30's     No prior cardiac history    HPI  Janina Lantigua is a 48 y.o. female admitted 8/20/2019 with dizziness/presyncope.  Recently in the ER for abdominal discomfort found to have UTI/pyelonephritis, was started on antibiotics.      History of syncope 20 years ago, work-up was unremarkable, asthma, bipolar disorder, fibromyalgia, tobacco abuse smokes a pack every 2 or 3 days.    Interim Events    8/22/19 bradycardia, rate 30's , denies dizziness, plan tentatively for PPM in am if blood cultures remain negative  Review of Systems  Review of Systems   Respiratory: Negative for apnea, cough, choking, chest tightness, shortness of breath, wheezing and stridor.        Vital signs in last 24 hours  Temp:  [36.2 °C (97.2 °F)-36.7 °C (98 °F)] 36.7 °C (98 °F)  Pulse:  [40-60] 60  Resp:  [16-18] 18  BP: (103-119)/(54-70) 108/70  SpO2:  [96 %-100 %] 98 %    Physical Exam  Physical Exam   Constitutional: She is oriented to person, place, and time. She appears well-developed.   HENT:   Head: Normocephalic.   Eyes: Conjunctivae are normal.   Neck: No thyromegaly present.   Cardiovascular: Regular rhythm. Bradycardia present.   Pulses:       Carotid pulses are 2+ on the right side, and 2+ on the left side.       Radial pulses are 2+ on the right side, and 2+ on the left side.   Pulmonary/Chest: She has no wheezes.   Abdominal: Soft.   Musculoskeletal: She exhibits no edema.   Neurological: She is alert and oriented to person, place, and time.   Skin: Skin is warm and dry.       Lab Review  Lab Results   Component Value Date/Time    WBC 7.0 08/22/2019 03:20 AM    RBC 3.85 (L) 08/22/2019 03:20 AM    HEMOGLOBIN 11.7 (L) 08/22/2019 03:20 AM    HEMATOCRIT 37.5 08/22/2019 03:20 AM    MCV 97.4 08/22/2019 03:20 AM    MCH 30.4 08/22/2019 03:20 AM     MCHC 31.2 (L) 2019 03:20 AM    MPV 8.9 (L) 2019 03:20 AM      Lab Results   Component Value Date/Time    SODIUM 140 2019 03:20 AM    POTASSIUM 4.3 2019 03:20 AM    CHLORIDE 110 2019 03:20 AM    CO2 27 2019 03:20 AM    GLUCOSE 95 2019 03:20 AM    BUN 12 2019 03:20 AM    CREATININE 0.80 2019 03:20 AM      Lab Results   Component Value Date/Time    ASTSGOT 15 2019 04:00 AM    ALTSGPT 12 2019 04:00 AM     Lab Results   Component Value Date/Time    CHOLSTRLTOT 112 2019 04:00 AM    LDL 65 2019 04:00 AM    HDL 32 (A) 2019 04:00 AM    TRIGLYCERIDE 73 2019 04:00 AM    TROPONINT <6 2019 04:00 AM       No results for input(s): NTPROBNP in the last 72 hours.    Cardiac Imaging and Procedures Review  EK19, sinus bradycardia, rate 30's    Echocardiogram: Pending        Imaging  Abdominal US 19, mild rufus-portal edema, nonspecific, unremarkable abdomen and pelvis        Assessment/Plan    Recent UTI  Profound bradycardia  Chronic pain in the form of fibromyalgia  Bipolar disorder  Tobacco abuse  Right lower quadrant abdominal pain, negative CAT scan      Plan is to proceed with pacemaker in the morning if cultures remain negative  Completed course of antibiotics  Follow-up on TTE  Reinforce abstinence from tobacco  N.p.o. after midnight      Please contact me with any questions.    AARON Pantoja.   Cardiologist, [facility]  [phone]

## 2019-08-22 NOTE — DISCHARGE PLANNING
Patient is eligible for Medicaid Meds to Beds at discharge. Preferred pharmacy changed to [facility]. Please call [phone] prior to discharge.

## 2019-08-23 ENCOUNTER — APPOINTMENT (OUTPATIENT)
Dept: RADIOLOGY | Facility: MEDICAL CENTER | Age: 49
DRG: 243 | End: 2019-08-23
Attending: INTERNAL MEDICINE
Payer: MEDICAID

## 2019-08-23 ENCOUNTER — APPOINTMENT (OUTPATIENT)
Dept: CARDIOLOGY | Facility: MEDICAL CENTER | Age: 49
DRG: 243 | End: 2019-08-23
Attending: NURSE PRACTITIONER
Payer: MEDICAID

## 2019-08-23 PROBLEM — K21.9 ACID REFLUX: Status: ACTIVE | Noted: 2019-08-23

## 2019-08-23 PROCEDURE — 700102 HCHG RX REV CODE 250 W/ 637 OVERRIDE(OP): Performed by: HOSPITALIST

## 2019-08-23 PROCEDURE — 700101 HCHG RX REV CODE 250

## 2019-08-23 PROCEDURE — 71045 X-RAY EXAM CHEST 1 VIEW: CPT

## 2019-08-23 PROCEDURE — 99153 MOD SED SAME PHYS/QHP EA: CPT

## 2019-08-23 PROCEDURE — 700111 HCHG RX REV CODE 636 W/ 250 OVERRIDE (IP)

## 2019-08-23 PROCEDURE — 02H63JZ INSERTION OF PACEMAKER LEAD INTO RIGHT ATRIUM, PERCUTANEOUS APPROACH: ICD-10-PCS | Performed by: INTERNAL MEDICINE

## 2019-08-23 PROCEDURE — 303746 HCHG EXTERNAL PACEMAKER PAD

## 2019-08-23 PROCEDURE — 93005 ELECTROCARDIOGRAM TRACING: CPT | Performed by: INTERNAL MEDICINE

## 2019-08-23 PROCEDURE — 700111 HCHG RX REV CODE 636 W/ 250 OVERRIDE (IP): Performed by: INTERNAL MEDICINE

## 2019-08-23 PROCEDURE — 02HK3JZ INSERTION OF PACEMAKER LEAD INTO RIGHT VENTRICLE, PERCUTANEOUS APPROACH: ICD-10-PCS | Performed by: INTERNAL MEDICINE

## 2019-08-23 PROCEDURE — A9270 NON-COVERED ITEM OR SERVICE: HCPCS | Performed by: HOSPITALIST

## 2019-08-23 PROCEDURE — 770020 HCHG ROOM/CARE - TELE (206)

## 2019-08-23 PROCEDURE — 0JH606Z INSERTION OF PACEMAKER, DUAL CHAMBER INTO CHEST SUBCUTANEOUS TISSUE AND FASCIA, OPEN APPROACH: ICD-10-PCS | Performed by: INTERNAL MEDICINE

## 2019-08-23 PROCEDURE — A9270 NON-COVERED ITEM OR SERVICE: HCPCS | Performed by: INTERNAL MEDICINE

## 2019-08-23 PROCEDURE — 700102 HCHG RX REV CODE 250 W/ 637 OVERRIDE(OP): Performed by: INTERNAL MEDICINE

## 2019-08-23 PROCEDURE — 99233 SBSQ HOSP IP/OBS HIGH 50: CPT | Performed by: INTERNAL MEDICINE

## 2019-08-23 RX ORDER — TRAMADOL HYDROCHLORIDE 50 MG/1
50 TABLET ORAL EVERY 6 HOURS PRN
Status: DISCONTINUED | OUTPATIENT
Start: 2019-08-23 | End: 2019-08-25 | Stop reason: HOSPADM

## 2019-08-23 RX ORDER — LIDOCAINE HYDROCHLORIDE 20 MG/ML
INJECTION, SOLUTION INFILTRATION; PERINEURAL
Status: COMPLETED
Start: 2019-08-23 | End: 2019-08-23

## 2019-08-23 RX ORDER — MIDAZOLAM HYDROCHLORIDE 1 MG/ML
INJECTION INTRAMUSCULAR; INTRAVENOUS
Status: COMPLETED
Start: 2019-08-23 | End: 2019-08-23

## 2019-08-23 RX ORDER — VANCOMYCIN HYDROCHLORIDE 1 G/20ML
INJECTION, POWDER, LYOPHILIZED, FOR SOLUTION INTRAVENOUS
Status: COMPLETED
Start: 2019-08-23 | End: 2019-08-23

## 2019-08-23 RX ORDER — BUPIVACAINE HYDROCHLORIDE 5 MG/ML
INJECTION, SOLUTION EPIDURAL; INTRACAUDAL
Status: COMPLETED
Start: 2019-08-23 | End: 2019-08-23

## 2019-08-23 RX ORDER — ACETAMINOPHEN 325 MG/1
650 TABLET ORAL EVERY 4 HOURS PRN
Status: DISCONTINUED | OUTPATIENT
Start: 2019-08-23 | End: 2019-08-25 | Stop reason: HOSPADM

## 2019-08-23 RX ADMIN — VANCOMYCIN HYDROCHLORIDE 3000 MG: 1 INJECTION, POWDER, LYOPHILIZED, FOR SOLUTION INTRAVENOUS at 17:23

## 2019-08-23 RX ADMIN — OXYCODONE HYDROCHLORIDE 5 MG: 5 TABLET ORAL at 18:56

## 2019-08-23 RX ADMIN — FENTANYL CITRATE 200 MCG: 0.05 INJECTION, SOLUTION INTRAMUSCULAR; INTRAVENOUS at 17:47

## 2019-08-23 RX ADMIN — OXYCODONE HYDROCHLORIDE 5 MG: 5 TABLET ORAL at 22:20

## 2019-08-23 RX ADMIN — IBUPROFEN 400 MG: 800 TABLET, FILM COATED ORAL at 05:23

## 2019-08-23 RX ADMIN — SENNOSIDES, DOCUSATE SODIUM 2 TABLET: 50; 8.6 TABLET, FILM COATED ORAL at 05:23

## 2019-08-23 RX ADMIN — MAGNESIUM HYDROXIDE 30 ML: 400 SUSPENSION ORAL at 05:23

## 2019-08-23 RX ADMIN — ONDANSETRON 4 MG: 2 INJECTION INTRAMUSCULAR; INTRAVENOUS at 18:56

## 2019-08-23 RX ADMIN — MIDAZOLAM HYDROCHLORIDE 4 MG: 1 INJECTION, SOLUTION INTRAMUSCULAR; INTRAVENOUS at 17:47

## 2019-08-23 RX ADMIN — SENNOSIDES, DOCUSATE SODIUM 2 TABLET: 50; 8.6 TABLET, FILM COATED ORAL at 18:56

## 2019-08-23 RX ADMIN — FAMOTIDINE 20 MG: 10 INJECTION INTRAVENOUS at 10:27

## 2019-08-23 RX ADMIN — BUPIVACAINE HYDROCHLORIDE: 5 INJECTION, SOLUTION EPIDURAL; INTRACAUDAL; PERINEURAL at 17:23

## 2019-08-23 RX ADMIN — FAMOTIDINE 20 MG: 10 INJECTION INTRAVENOUS at 18:53

## 2019-08-23 RX ADMIN — LIDOCAINE HYDROCHLORIDE: 20 INJECTION, SOLUTION INFILTRATION; PERINEURAL at 17:23

## 2019-08-23 ASSESSMENT — ENCOUNTER SYMPTOMS
DIAPHORESIS: 0
PALPITATIONS: 0
FEVER: 0
SHORTNESS OF BREATH: 0
MYALGIAS: 1
DIZZINESS: 0
MEMORY LOSS: 0
CHILLS: 0
FLANK PAIN: 0
ABDOMINAL PAIN: 0
HEARTBURN: 0
HEADACHES: 0
NERVOUS/ANXIOUS: 0
FOCAL WEAKNESS: 0

## 2019-08-23 ASSESSMENT — PATIENT HEALTH QUESTIONNAIRE - PHQ9
SUM OF ALL RESPONSES TO PHQ9 QUESTIONS 1 AND 2: 0
2. FEELING DOWN, DEPRESSED, IRRITABLE, OR HOPELESS: NOT AT ALL
1. LITTLE INTEREST OR PLEASURE IN DOING THINGS: NOT AT ALL

## 2019-08-23 NOTE — PROGRESS NOTES
Notified by monitor room that HR was down to 24. Pacer pads placed on patient as a precaution. Pt is asymptomatic at this time and VSS (apart from hr)

## 2019-08-23 NOTE — PROGRESS NOTES
[facility] Medicine Daily Progress Note    Date of Service  8/23/2019    Chief Complaint  48 y.o. female admitted 8/20/2019 with h/o bipolar disorder not currently on rx, recent UTI on abx, now admitted for symptomatic bradycardia and nausea.  Continue cardiac monitoring.    Hospital Course    48 y.o. female admitted 8/20/2019 with h/o bipolar disorder not currently on rx, recent UTI on abx, now admitted for symptomatic bradycardia and nausea.  Continue cardiac monitoring.      Interval Problem Update  Feels better  Denies dizziness  Reports minimal abdominal discomfort    Consultants/Specialty  cardiology    Code Status  Full    Disposition  TBD  Plan for PM placement Friday      Review of Systems  Review of Systems   Constitutional: Negative for chills, diaphoresis, fever and malaise/fatigue.   HENT: Negative for hearing loss.    Respiratory: Negative for shortness of breath.    Cardiovascular: Negative for chest pain and palpitations.   Gastrointestinal: Negative for abdominal pain and heartburn.   Genitourinary: Negative for dysuria, flank pain, frequency and urgency.   Musculoskeletal: Positive for myalgias.   Neurological: Negative for dizziness, focal weakness and headaches.   Psychiatric/Behavioral: Negative for memory loss. The patient is not nervous/anxious.         Physical Exam  Temp:  [36.2 °C (97.1 °F)-36.7 °C (98.1 °F)] 36.3 °C (97.4 °F)  Pulse:  [36-45] 42  Resp:  [16] 16  BP: (104-128)/(57-68) 125/66  SpO2:  [96 %-100 %] 96 %    Physical Exam   Constitutional: She is oriented to person, place, and time. No distress.   HENT:   Head: Normocephalic and atraumatic.   Mouth/Throat: No oropharyngeal exudate.   Eyes: Pupils are equal, round, and reactive to light. EOM are normal.   Neck: Neck supple.   Cardiovascular: Normal rate and intact distal pulses.   Pulmonary/Chest: Breath sounds normal. No respiratory distress. She has no wheezes.   Abdominal: Soft. Bowel sounds are normal. She exhibits no  distension. There is no tenderness.   Musculoskeletal: She exhibits no edema.   Neurological: She is alert and oriented to person, place, and time. No cranial nerve deficit.   Skin: Skin is warm and dry. She is not diaphoretic.   Psychiatric: She has a normal mood and affect. Her behavior is normal. Judgment and thought content normal.   Nursing note and vitals reviewed.      Fluids    Intake/Output Summary (Last 24 hours) at 8/23/2019 1231  Last data filed at 8/22/2019 1400  Gross per 24 hour   Intake 240 ml   Output --   Net 240 ml       Laboratory  Recent Labs     08/21/19  0400 08/22/19  0320   WBC 6.2 7.0   RBC 3.98* 3.85*   HEMOGLOBIN 12.1 11.7*   HEMATOCRIT 39.0 37.5   MCV 98.0* 97.4   MCH 30.4 30.4   MCHC 31.0* 31.2*   RDW 46.8 47.3   PLATELETCT 193 221   MPV 8.5* 8.9*     Recent Labs     08/21/19  0400 08/22/19  0320   SODIUM 141 140   POTASSIUM 4.1 4.3   CHLORIDE 110 110   CO2 27 27   GLUCOSE 96 95   BUN 15 12   CREATININE 0.77 0.80   CALCIUM 8.0* 7.9*             Recent Labs     08/21/19  0400   TRIGLYCERIDE 73   HDL 32*   LDL 65       Imaging  EC-ECHOCARDIOGRAM COMPLETE W/O CONT   Final Result      Cardiac Stress Test Treadmill Only   Final Result      CT-ABDOMEN-PELVIS WITH   Final Result      1.  Mild periportal edema, nonspecific.   2.  Otherwise unremarkable abdomen and pelvis.      CL-PERMANENT PACEMAKER INSERTION    (Results Pending)        Assessment/Plan  Bradycardia- (present on admission)  Assessment & Plan  Sinus bradycardia, appears to be symptomatic,  Patient presyncopal and lightheaded  Cardiology evaluation  thyroid function, cortisol-within normal limits    8/21 echocardiogram-pending  -stress testing  - on ivf    8/22 symptomatic bradycardia requiring pacemaker placement  Per cardiology plan for placement this Friday  Follow-up blood cultures, currently negative    8/23 n.p.o. for pacemaker placement today  Denies dizziness  Blood cultures remain negative  Status post 3 days  antibiotics    Right lower quadrant abdominal pain- (present on admission)  Assessment & Plan  CT scan negative for pathology  Possibly still related to urinary tract infection    UTI (urinary tract infection)- (present on admission)  Assessment & Plan  Possibly partially treated, continue antibiotic therapy and observe    8/21 Rocephin, needs 3 day course  D/w cardiology, plan for PM placement    8/22 blood cultures from the 21st remain negative  On Rocephin  Improving dysuria  Denies flank pain    8/23 status post antibiotics x3 days  Blood cultures negative  Urine culture negative  Partial treatment prior to this admission    Acid reflux  Assessment & Plan  Add Pepcid    Tobacco abuse- (present on admission)  Assessment & Plan  Strongly advised to enroll in cessation    Chronic pain- (present on admission)  Assessment & Plan  With history of fibromyalgia, avoid narcotics    Bipolar affective disorder in remission (HCC)- (present on admission)  Assessment & Plan  History of medication use, she reports recently none       VTE prophylaxis: Lovenox

## 2019-08-24 ENCOUNTER — PATIENT OUTREACH (OUTPATIENT)
Dept: HEALTH INFORMATION MANAGEMENT | Facility: OTHER | Age: 49
End: 2019-08-24

## 2019-08-24 ENCOUNTER — APPOINTMENT (OUTPATIENT)
Dept: RADIOLOGY | Facility: MEDICAL CENTER | Age: 49
DRG: 243 | End: 2019-08-24
Attending: INTERNAL MEDICINE
Payer: MEDICAID

## 2019-08-24 PROBLEM — R51.9 HEADACHE: Status: ACTIVE | Noted: 2019-08-24

## 2019-08-24 LAB
ANION GAP SERPL CALC-SCNC: 7 MMOL/L (ref 0–11.9)
BASOPHILS # BLD AUTO: 0.3 % (ref 0–1.8)
BASOPHILS # BLD: 0.03 K/UL (ref 0–0.12)
BUN SERPL-MCNC: 12 MG/DL (ref 8–22)
CALCIUM SERPL-MCNC: 8.3 MG/DL (ref 8.5–10.5)
CHLORIDE SERPL-SCNC: 108 MMOL/L (ref 96–112)
CO2 SERPL-SCNC: 26 MMOL/L (ref 20–33)
CREAT SERPL-MCNC: 0.92 MG/DL (ref 0.5–1.4)
EKG IMPRESSION: NORMAL
EOSINOPHIL # BLD AUTO: 0.28 K/UL (ref 0–0.51)
EOSINOPHIL NFR BLD: 3.3 % (ref 0–6.9)
ERYTHROCYTE [DISTWIDTH] IN BLOOD BY AUTOMATED COUNT: 44.3 FL (ref 35.9–50)
GLUCOSE SERPL-MCNC: 105 MG/DL (ref 65–99)
HCT VFR BLD AUTO: 40.1 % (ref 37–47)
HGB BLD-MCNC: 13.4 G/DL (ref 12–16)
IMM GRANULOCYTES # BLD AUTO: 0.03 K/UL (ref 0–0.11)
IMM GRANULOCYTES NFR BLD AUTO: 0.3 % (ref 0–0.9)
LYMPHOCYTES # BLD AUTO: 2.22 K/UL (ref 1–4.8)
LYMPHOCYTES NFR BLD: 25.9 % (ref 22–41)
MCH RBC QN AUTO: 31.5 PG (ref 27–33)
MCHC RBC AUTO-ENTMCNC: 33.4 G/DL (ref 33.6–35)
MCV RBC AUTO: 94.1 FL (ref 81.4–97.8)
MONOCYTES # BLD AUTO: 0.59 K/UL (ref 0–0.85)
MONOCYTES NFR BLD AUTO: 6.9 % (ref 0–13.4)
NEUTROPHILS # BLD AUTO: 5.43 K/UL (ref 2–7.15)
NEUTROPHILS NFR BLD: 63.3 % (ref 44–72)
NRBC # BLD AUTO: 0 K/UL
NRBC BLD-RTO: 0 /100 WBC
PLATELET # BLD AUTO: 236 K/UL (ref 164–446)
PMV BLD AUTO: 9.2 FL (ref 9–12.9)
POTASSIUM SERPL-SCNC: 4.2 MMOL/L (ref 3.6–5.5)
RBC # BLD AUTO: 4.26 M/UL (ref 4.2–5.4)
SODIUM SERPL-SCNC: 141 MMOL/L (ref 135–145)
WBC # BLD AUTO: 8.6 K/UL (ref 4.8–10.8)

## 2019-08-24 PROCEDURE — 700102 HCHG RX REV CODE 250 W/ 637 OVERRIDE(OP): Performed by: HOSPITALIST

## 2019-08-24 PROCEDURE — 36415 COLL VENOUS BLD VENIPUNCTURE: CPT

## 2019-08-24 PROCEDURE — 99233 SBSQ HOSP IP/OBS HIGH 50: CPT | Performed by: INTERNAL MEDICINE

## 2019-08-24 PROCEDURE — 93005 ELECTROCARDIOGRAM TRACING: CPT | Performed by: INTERNAL MEDICINE

## 2019-08-24 PROCEDURE — A9270 NON-COVERED ITEM OR SERVICE: HCPCS | Performed by: INTERNAL MEDICINE

## 2019-08-24 PROCEDURE — 700102 HCHG RX REV CODE 250 W/ 637 OVERRIDE(OP): Performed by: INTERNAL MEDICINE

## 2019-08-24 PROCEDURE — 700111 HCHG RX REV CODE 636 W/ 250 OVERRIDE (IP): Performed by: INTERNAL MEDICINE

## 2019-08-24 PROCEDURE — 71045 X-RAY EXAM CHEST 1 VIEW: CPT

## 2019-08-24 PROCEDURE — 80048 BASIC METABOLIC PNL TOTAL CA: CPT

## 2019-08-24 PROCEDURE — 85025 COMPLETE CBC W/AUTO DIFF WBC: CPT

## 2019-08-24 PROCEDURE — 93010 ELECTROCARDIOGRAM REPORT: CPT | Performed by: INTERNAL MEDICINE

## 2019-08-24 PROCEDURE — 93010 ELECTROCARDIOGRAM REPORT: CPT | Mod: 77 | Performed by: INTERNAL MEDICINE

## 2019-08-24 PROCEDURE — A9270 NON-COVERED ITEM OR SERVICE: HCPCS | Performed by: HOSPITALIST

## 2019-08-24 PROCEDURE — 700111 HCHG RX REV CODE 636 W/ 250 OVERRIDE (IP): Performed by: HOSPITALIST

## 2019-08-24 PROCEDURE — 770020 HCHG ROOM/CARE - TELE (206)

## 2019-08-24 PROCEDURE — 93010 ELECTROCARDIOGRAM REPORT: CPT | Mod: 76 | Performed by: INTERNAL MEDICINE

## 2019-08-24 RX ORDER — FAMOTIDINE 20 MG/1
20 TABLET, FILM COATED ORAL 2 TIMES DAILY
Status: DISCONTINUED | OUTPATIENT
Start: 2019-08-24 | End: 2019-08-25 | Stop reason: HOSPADM

## 2019-08-24 RX ORDER — KETOROLAC TROMETHAMINE 30 MG/ML
30 INJECTION, SOLUTION INTRAMUSCULAR; INTRAVENOUS EVERY 6 HOURS PRN
Status: DISCONTINUED | OUTPATIENT
Start: 2019-08-24 | End: 2019-08-25 | Stop reason: HOSPADM

## 2019-08-24 RX ADMIN — FAMOTIDINE 20 MG: 20 TABLET ORAL at 17:26

## 2019-08-24 RX ADMIN — KETOROLAC TROMETHAMINE 30 MG: 30 INJECTION, SOLUTION INTRAMUSCULAR at 13:05

## 2019-08-24 RX ADMIN — SENNOSIDES, DOCUSATE SODIUM 2 TABLET: 50; 8.6 TABLET, FILM COATED ORAL at 17:26

## 2019-08-24 RX ADMIN — FAMOTIDINE 20 MG: 10 INJECTION INTRAVENOUS at 05:28

## 2019-08-24 RX ADMIN — ONDANSETRON 4 MG: 2 INJECTION INTRAMUSCULAR; INTRAVENOUS at 07:37

## 2019-08-24 RX ADMIN — HYDROMORPHONE HYDROCHLORIDE 0.25 MG: 1 INJECTION, SOLUTION INTRAMUSCULAR; INTRAVENOUS; SUBCUTANEOUS at 07:35

## 2019-08-24 RX ADMIN — ENOXAPARIN SODIUM 40 MG: 100 INJECTION SUBCUTANEOUS at 05:29

## 2019-08-24 RX ADMIN — OXYCODONE HYDROCHLORIDE 5 MG: 5 TABLET ORAL at 17:29

## 2019-08-24 RX ADMIN — SENNOSIDES, DOCUSATE SODIUM 2 TABLET: 50; 8.6 TABLET, FILM COATED ORAL at 05:28

## 2019-08-24 RX ADMIN — MAGNESIUM HYDROXIDE 30 ML: 400 SUSPENSION ORAL at 13:05

## 2019-08-24 RX ADMIN — OXYCODONE HYDROCHLORIDE 5 MG: 5 TABLET ORAL at 21:33

## 2019-08-24 RX ADMIN — OXYCODONE HYDROCHLORIDE 5 MG: 5 TABLET ORAL at 05:35

## 2019-08-24 ASSESSMENT — ENCOUNTER SYMPTOMS
DEPRESSION: 0
FEVER: 0
CHILLS: 0
ABDOMINAL PAIN: 0
APNEA: 0
WHEEZING: 0
FLANK PAIN: 0
NERVOUS/ANXIOUS: 0
FOCAL WEAKNESS: 0
MEMORY LOSS: 0
SHORTNESS OF BREATH: 0
COUGH: 0
CHEST TIGHTNESS: 0
CHOKING: 0
MYALGIAS: 1
STRIDOR: 0
NAUSEA: 0
DIAPHORESIS: 0
PALPITATIONS: 0
HEADACHES: 0

## 2019-08-24 NOTE — PROGRESS NOTES
Hospital Medicine Daily Progress Note    Date of Service  8/24/2019    Chief Complaint  48 y.o. female admitted 8/20/2019 with h/o bipolar disorder not currently on rx, recent UTI on abx, now admitted for symptomatic bradycardia and nausea.  Continue cardiac monitoring.    Hospital Course    48 y.o. female admitted 8/20/2019 with h/o bipolar disorder not currently on rx, recent UTI on abx, now admitted for symptomatic bradycardia and nausea.  Continue cardiac monitoring.      Interval Problem Update  Reports temporal headache today  Minimal left-sided chest discomfort at pacemaker insertion site  No shortness of breath, nausea or vomiting    Consultants/Specialty  cardiology    Code Status  Full    Disposition  To home in 1 to 2 days with clinical improvement      Review of Systems  Review of Systems   Constitutional: Negative for chills, diaphoresis, fever and malaise/fatigue.   HENT: Negative for congestion.    Respiratory: Negative for shortness of breath.    Cardiovascular: Negative for palpitations and leg swelling.   Gastrointestinal: Negative for abdominal pain and nausea.   Genitourinary: Negative for dysuria, flank pain and frequency.   Musculoskeletal: Positive for myalgias. Negative for joint pain.   Neurological: Negative for focal weakness and headaches.   Psychiatric/Behavioral: Negative for depression and memory loss. The patient is not nervous/anxious.         Physical Exam  Temp:  [35.8 °C (96.5 °F)-36.3 °C (97.4 °F)] 36.2 °C (97.2 °F)  Pulse:  [33-67] 60  Resp:  [14-18] 16  BP: ()/(61-92) 116/67  SpO2:  [91 %-100 %] 100 %    Physical Exam   Constitutional: She is oriented to person, place, and time. No distress.   HENT:   Head: Normocephalic and atraumatic.   Eyes: Pupils are equal, round, and reactive to light. EOM are normal. Right eye exhibits no discharge. Left eye exhibits no discharge.   Neck: Neck supple.   Cardiovascular: Normal rate and intact distal pulses.   Pulmonary/Chest: Effort  normal and breath sounds normal. No respiratory distress. She exhibits no tenderness.   Abdominal: Soft. Bowel sounds are normal. She exhibits no distension.   Musculoskeletal: She exhibits no edema.   Neurological: She is alert and oriented to person, place, and time. No cranial nerve deficit. She exhibits normal muscle tone. Coordination normal.   Skin: Skin is warm and dry.   Psychiatric: She has a normal mood and affect. Her behavior is normal. Judgment and thought content normal.   Nursing note and vitals reviewed.      Fluids  No intake or output data in the 24 hours ending 08/24/19 1250    Laboratory  Recent Labs     08/22/19  0320 08/24/19  0303   WBC 7.0 8.6   RBC 3.85* 4.26   HEMOGLOBIN 11.7* 13.4   HEMATOCRIT 37.5 40.1   MCV 97.4 94.1   MCH 30.4 31.5   MCHC 31.2* 33.4*   RDW 47.3 44.3   PLATELETCT 221 236   MPV 8.9* 9.2     Recent Labs     08/22/19  0320 08/24/19  0303   SODIUM 140 141   POTASSIUM 4.3 4.2   CHLORIDE 110 108   CO2 27 26   GLUCOSE 95 105*   BUN 12 12   CREATININE 0.80 0.92   CALCIUM 7.9* 8.3*                   Imaging  DX-CHEST-PORTABLE (1 VIEW)   Final Result         No appreciable pneumothorax status post left pacemaker placement.      DX-CHEST-PORTABLE (1 VIEW)   Final Result      No pleural effusion or pneumothorax following left pacer implantation.         EC-ECHOCARDIOGRAM COMPLETE W/O CONT   Final Result      Cardiac Stress Test Treadmill Only   Final Result      CT-ABDOMEN-PELVIS WITH   Final Result      1.  Mild periportal edema, nonspecific.   2.  Otherwise unremarkable abdomen and pelvis.      CL-PERMANENT PACEMAKER INSERTION    (Results Pending)        Assessment/Plan  Bradycardia- (present on admission)  Assessment & Plan  Sinus bradycardia, appears to be symptomatic,  Patient presyncopal and lightheaded  Cardiology evaluation  thyroid function, cortisol-within normal limits    8/21 echocardiogram-pending  -stress testing  - on ivf    8/22 symptomatic bradycardia requiring  pacemaker placement  Per cardiology plan for placement this Friday  Follow-up blood cultures, currently negative    8/23 n.p.o. for pacemaker placement today  Denies dizziness  Blood cultures remain negative  Status post 3 days antibiotics    8/24  Status post pacemaker placement  Continue cardiac monitoring      Right lower quadrant abdominal pain- (present on admission)  Assessment & Plan  CT scan negative for pathology  Possibly still related to urinary tract infection    UTI (urinary tract infection)- (present on admission)  Assessment & Plan  Possibly partially treated, continue antibiotic therapy and observe    8/21 Rocephin, needs 3 day course  D/w cardiology, plan for PM placement    8/22 blood cultures from the 21st remain negative  On Rocephin  Improving dysuria  Denies flank pain    8/23 status post antibiotics x3 days  Blood cultures negative  Urine culture negative  Partial treatment prior to this admission    Headache  Assessment & Plan  Toradol as needed  Monitor    Acid reflux  Assessment & Plan  Pepcid    Tobacco abuse- (present on admission)  Assessment & Plan  Strongly advised to enroll in cessation    Chronic pain- (present on admission)  Assessment & Plan  With history of fibromyalgia, avoid narcotics    Bipolar affective disorder in remission (HCC)- (present on admission)  Assessment & Plan  History of medication use, she reports recently none       VTE prophylaxis: Lovenox

## 2019-08-24 NOTE — PROGRESS NOTES
Pt tearful this morning, c/o severe headache and R sided chest pain she states started around 7am. VSS. Given dilaudid IV and MD notified. Ordered EKG. Pt reports significant relief after dilaudid. Will continue to monitor.

## 2019-08-25 VITALS
TEMPERATURE: 97.8 F | BODY MASS INDEX: 24.49 KG/M2 | HEIGHT: 68 IN | SYSTOLIC BLOOD PRESSURE: 105 MMHG | WEIGHT: 161.6 LBS | HEART RATE: 60 BPM | RESPIRATION RATE: 16 BRPM | OXYGEN SATURATION: 97 % | DIASTOLIC BLOOD PRESSURE: 67 MMHG

## 2019-08-25 PROCEDURE — 99239 HOSP IP/OBS DSCHRG MGMT >30: CPT | Performed by: INTERNAL MEDICINE

## 2019-08-25 PROCEDURE — 99152 MOD SED SAME PHYS/QHP 5/>YRS: CPT | Performed by: INTERNAL MEDICINE

## 2019-08-25 PROCEDURE — 33208 INSRT HEART PM ATRIAL & VENT: CPT | Performed by: INTERNAL MEDICINE

## 2019-08-25 PROCEDURE — 700111 HCHG RX REV CODE 636 W/ 250 OVERRIDE (IP): Performed by: HOSPITALIST

## 2019-08-25 PROCEDURE — 700102 HCHG RX REV CODE 250 W/ 637 OVERRIDE(OP): Performed by: HOSPITALIST

## 2019-08-25 PROCEDURE — A9270 NON-COVERED ITEM OR SERVICE: HCPCS | Performed by: INTERNAL MEDICINE

## 2019-08-25 PROCEDURE — 700102 HCHG RX REV CODE 250 W/ 637 OVERRIDE(OP): Performed by: INTERNAL MEDICINE

## 2019-08-25 PROCEDURE — A9270 NON-COVERED ITEM OR SERVICE: HCPCS | Performed by: HOSPITALIST

## 2019-08-25 RX ORDER — OXYCODONE HYDROCHLORIDE 5 MG/1
2.5 TABLET ORAL
Qty: 10 TAB | Refills: 0 | Status: SHIPPED | OUTPATIENT
Start: 2019-08-25 | End: 2019-08-30

## 2019-08-25 RX ORDER — NICOTINE 21 MG/24HR
1 PATCH, TRANSDERMAL 24 HOURS TRANSDERMAL EVERY 24 HOURS
Qty: 30 PATCH | Refills: 0 | Status: SHIPPED | OUTPATIENT
Start: 2019-08-25 | End: 2019-09-03

## 2019-08-25 RX ORDER — IBUPROFEN 400 MG/1
400 TABLET ORAL EVERY 6 HOURS PRN
Qty: 30 TAB | Refills: 0 | Status: SHIPPED | OUTPATIENT
Start: 2019-08-25 | End: 2019-09-03

## 2019-08-25 RX ORDER — NICOTINE 21 MG/24HR
1 PATCH, TRANSDERMAL 24 HOURS TRANSDERMAL EVERY 24 HOURS
Qty: 30 PATCH | Refills: 0 | Status: SHIPPED | OUTPATIENT
Start: 2019-08-25 | End: 2019-08-25

## 2019-08-25 RX ORDER — IBUPROFEN 400 MG/1
400 TABLET ORAL EVERY 6 HOURS PRN
Qty: 30 TAB | Refills: 0 | Status: SHIPPED | OUTPATIENT
Start: 2019-08-25 | End: 2019-08-25

## 2019-08-25 RX ADMIN — SENNOSIDES, DOCUSATE SODIUM 2 TABLET: 50; 8.6 TABLET, FILM COATED ORAL at 05:34

## 2019-08-25 RX ADMIN — OXYCODONE HYDROCHLORIDE 5 MG: 5 TABLET ORAL at 05:34

## 2019-08-25 RX ADMIN — MAGNESIUM HYDROXIDE 30 ML: 400 SUSPENSION ORAL at 05:34

## 2019-08-25 RX ADMIN — FAMOTIDINE 20 MG: 20 TABLET ORAL at 05:34

## 2019-08-25 RX ADMIN — OXYCODONE HYDROCHLORIDE 5 MG: 5 TABLET ORAL at 09:30

## 2019-08-25 RX ADMIN — OXYCODONE HYDROCHLORIDE 5 MG: 5 TABLET ORAL at 14:25

## 2019-08-25 RX ADMIN — ENOXAPARIN SODIUM 40 MG: 100 INJECTION SUBCUTANEOUS at 05:34

## 2019-08-25 NOTE — PROGRESS NOTES
Pt ready for discharge.  Daughter downstairs to transport home.   Pt educated verbally and in writing on pacer/LUE precautions.  DC orders reviewed and pt states understanding.  Rx given to pt.  Belonging with pt at DC via W/C and CNA

## 2019-08-25 NOTE — PROGRESS NOTES
Call placed and voicemail left for [facility] per pt's request to see if they will see pt today prior to her discharge.  Per Cait, DC instructions were left in progress note.  Instructions were copied and pasted to DC paperwork

## 2019-08-25 NOTE — DISCHARGE INSTRUCTIONS
Discharge Instructions  PPM restrictions reviewed with patient. Do not raise affected arm above head or behind back for six weeks. May remove arm sling after 24 hrs, please wear if trouble remembering arm limitations and prn at night. No heavy lifting/pushing for six weeks. No driving for first week. No showers first week. Keep dressing on, clean, and dry until sees us in follow up. Wound care reviewed. Instructed to report s/s of infection such as warmth/redness/drainage/swelling at site or fever/chills.              Discharged to home by car with relative. Discharged via wheelchair, hospital escort: Yes.  Special equipment needed: Not Applicable    Be sure to schedule a follow-up appointment with your primary care doctor or any specialists as instructed.     Discharge Plan:   Influenza Vaccine Indication: Indicated: Not available from distributor/    I understand that a diet low in cholesterol, fat, and sodium is recommended for good health. Unless I have been given specific instructions below for another diet, I accept this instruction as my diet prescription.   Other diet: cardiac    Special Instructions: None    · Is patient discharged on Warfarin / Coumadin?   No     Depression / Suicide Risk    As you are discharged from this [facility] facility, it is important to learn how to keep safe from harming yourself.    Recognize the warning signs:  · Abrupt changes in personality, positive or negative- including increase in energy   · Giving away possessions  · Change in eating patterns- significant weight changes-  positive or negative  · Change in sleeping patterns- unable to sleep or sleeping all the time   · Unwillingness or inability to communicate  · Depression  · Unusual sadness, discouragement and loneliness  · Talk of wanting to die  · Neglect of personal appearance   · Rebelliousness- reckless behavior  · Withdrawal from people/activities they love  · Confusion- inability to  concentrate     If you or a loved one observes any of these behaviors or has concerns about self-harm, here's what you can do:  · Talk about it- your feelings and reasons for harming yourself  · Remove any means that you might use to hurt yourself (examples: pills, rope, extension cords, firearm)  · Get professional help from the community (Mental Health, Substance Abuse, psychological counseling)  · Do not be alone:Call your Safe Contact- someone whom you trust who will be there for you.  · Call your local CRISIS HOTLINE 535-9516 or 577-927-7709  · Call your local Children's Mobile Crisis Response Team [place] (251) 675-8102 or www.[employer]  · Call the toll free National Suicide Prevention Hotlines   · National Suicide Prevention Lifeline 587-099-FTQA (6824)  · [place] [facility] Line Network 800-SUICIDE (714-2596)    Pacemaker Implantation, Care After  Refer to this sheet over the next few weeks. These instructions provide you with information on caring for yourself after the procedure. Your health care provider may also give you more specific instructions. Your treatment has been planned according to current medical practices, but problems sometimes occur. Call your health care provider if you have any problems or questions regarding your pacemaker.   WHAT TO EXPECT AFTER THE PROCEDURE  · You may feel pain. Some pain is normal. It may last a few days.  · A slight bump may be seen over the skin where the device was placed. Sometimes, it is possible to feel the device under the skin. This is normal.  · In the months and years afterward, your health care provider will check the device, the leads, and the battery every few months. Eventually, when the battery is low, the device will be replaced.  HOME CARE INSTRUCTIONS  Medicines  · Take medicines only as directed by your health care provider.  · If you were prescribed an antibiotic medicine, finish it all even if you start to feel better.  · Do not take any  other medicines without asking your health care provider first. Some medicines, including certain painkillers, can cause bleeding in your stomach after surgery.  Wound Care  · Do not remove the bandage on your chest until directed to do so by your health care provider.  · After your bandage is removed, you may see pieces of tape called skin adhesive strips over the area where the cut was made (incision site). Let them fall off on their own.  · Check the incision site every day to make sure it is not infected, bleeding, or starting to pull apart.  · Do not use lotions or ointments near the incision site unless directed to do so.  · Keep the incision area clean and dry for 2-3 days after the procedure or as directed by your health care provider. It takes several weeks for the incision site to completely heal.  · Do not take baths, swim, or use a hot tub until your health care provider approves.  Activities  · Try to walk a little every day. Exercising is important after this procedure. It is also important to use your shoulder on the side of the pacemaker in daily tasks that do not require exaggerated motion.  · Avoid sudden jerking, pulling, or chopping movements that pull your upper arm far away from your body for at least 6 weeks.  · Do not lift your upper arm above your shoulders for at least 6 weeks. This means no tennis, golf, or swimming for this period of time. If you sleep with the arm above your head, use a restraint to prevent this from happening as you sleep.  · You may go back to work when your health care provider says it is okay. Check with your health care provider before you start to drive or play sports.  Other Instructions  · Follow diet instructions if they were provided. You should be able to eat what you usually do right away, but you may need to limit your salt intake.  · Weigh yourself every day. If you suddenly gain weight, fluid may be building up in your body.  · Always carry your pacemaker  identification card with you. The card should list the implant date, device model, and . Consider wearing a medical alert bracelet or necklace.  · Tell all health care providers that you have a pacemaker. This may prevent them from giving you a magnetic resource imaging scan (MRI) because of the strong magnets used during that test.  · If you must pass through a metal detector, quickly walk through it. Do not stop under the detector or stand near it.  · Avoid places or objects with a strong electric or magnetic field, including:  ¨ Airport security garcia. When at the airport, let officials know you have a pacemaker. Your ID card will let you be checked in a way that is safe for you and that will not damage your pacemaker. Also, do not let a security person wave a magnetic wand near your pacemaker. That can make it stop working.  ¨ Power plants.  ¨ Large electrical generators.  ¨ Radiofrequency transmission towers, such as cell phone and radio towers.  · Do not use amateur (ham) radio equipment or electric (arc) welding torches. Some devices are safe to use if held at least 1 foot from your pacemaker. These include power tools, lawn mowers, and speakers. If you are unsure of whether something is safe to use, ask your health care provider.  · You may safely use electric blankets, heating pads, computers, and microwave ovens.  · When using your cell phone, hold it to the ear opposite the pacemaker. Do not leave your cell phone in a pocket over the pacemaker.  · Keep all follow-up visits as directed by your health care provider. This is how your health care provider makes sure your chest is healing the way it should. Ask your health care provider when you should come back to have your stitches or staples taken out.  · Have your pacemaker checked every 3-6 months or as directed by your health care provider. Most pacemakers last for 4-8 years before a new one is needed.  SEEK MEDICAL CARE IF:  · You gain  weight suddenly.  · Your legs or feet swell more than they have before.  · It feels like your heart is fluttering or skipping beats (heart palpitations).  · You have a fever.  SEEK IMMEDIATE MEDICAL CARE IF:  · You have chest pain.  · You feel more short of breath than you have felt before.  · You feel more light-headed than you have felt before.  · You have problems with your incision site, such as swelling or bleeding, or it starts to open up.  · You have drainage, redness, swelling, or pain at your incision site.     This information is not intended to replace advice given to you by your health care provider. Make sure you discuss any questions you have with your health care provider.     Document Released: 07/07/2006 Document Revised: 01/08/2016 Document Reviewed: 04/19/2013  [employer] Patient Education ©2016 [employer]  Pacemaker Follow-up   A pacemaker follow-up is done to evaluate the pacemaker and its battery (pulse generator). Regular follow-up visits must be done to assess pacemaker function. These visits are determined by how old the pacemaker is or if there are signs of a low battery. Most pacemakers can also sense if your heart has had any abnormal heart beats (arrhythmias). Different kinds of tests are used to look at pacemaker function, check for abnormal heart beats, and assess battery life. If the battery energy is low, you may need a new battery.  BEFORE THE PROCEDURE   Your caregiver may do a physical exam to:  · Examine your neck veins for swelling (engorgement).   · Feel the skin over the pacemaker for swelling or tenderness.   · Check your skin over the pacemaker for signs of redness or wearing away.   · Make certain the pacemaker has not moved from its original position.   LET YOUR CAREGIVER KNOW ABOUT:   · Chest pain.   · Dizziness.   · Fainting (syncope).   · Skipped heart beats (palpitations).   · Fatigue or low energy.   · Any car accidents, falls, or injuries that have  "happened. It is very important for your caregiver to know about any injury that has occurred to your chest or back. Injuries near the pacemaker can affect how it functions.   · Any type of \"hiccuping\" or \"jumping\" near your upper stomach (diaphragm). If this happens, the pacemaker settings may need to be changed. This does not mean your pacemaker is malfunctioning.   PROCEDURE  There are different ways your pacemaker can be checked. These include:   COMPREHENSIVE EVALUATION  To perform a comprehensive evaluation, your caregiver may use a computer called a . The  has a special wand that is placed over the pacemaker. The wand communicates with the pacemaker through a radio wave signal. The signal receives information from the pulse generator. By this method, your caregiver can assess pacemaker sensing, function, and battery life. Your caregiver can also make changes to the pacemaker settings. A comprehensive evaluation helps your caregiver fine-tune your pacemaker.  PACEMAKER MAGNET ASSESSMENT  Your caregiver may perform a magnet test of your pacemaker. This test looks at pacemaker function and battery life. A special magnet is placed over your pacemaker. The magnet causes the pacemaker to function at a fixed rate. The magnet provides some basic testing that can be done at home or in your caregiver's office.   TRANS-TELEPHONIC MONITORING  Your caregiver can also monitor your pacemaker at home. This is done over the telephone using a trans-telephonic method. A special device called a trans-telephonic transmitter sends information about your pacemaker to your caregiver's office. The information received allows your caregiver to see if the pacemaker is sensing the heartbeats properly, getting a low battery, or pacing abnormally.   AFTER THE PROCEDURE  · You will get a copy of your follow-up visit. The information will have the model number, date, current function and remaining battery life.   · Keep " your pacemaker card with you at all times. It is important to know:   · Implant date.   · Type (NBE code).   · Brand.   · .   · Programmed rate.   · Generator model.   · If the pacemaker has detected an abnormal heart beat, more testing may be needed.   Document Released: 09/26/2009 Document Revised: 03/11/2013 Document Reviewed: 09/26/2009  [employer]® Patient Information ©2013 [employer].Discharge Instructions    Discharged to home by car with relative. Discharged via wheelchair, hospital escort: Yes.  Special equipment needed: Not Applicable    Be sure to schedule a follow-up appointment with your primary care doctor or any specialists as instructed.     Discharge Plan:   Influenza Vaccine Indication: Indicated: Not available from distributor/    I understand that a diet low in cholesterol, fat, and sodium is recommended for good health. Unless I have been given specific instructions below for another diet, I accept this instruction as my diet prescription.   Other diet: cardiac      Special Instructions:     · Is patient discharged on Warfarin / Coumadin?   No     Depression / Suicide Risk    As you are discharged from this [facility] facility, it is important to learn how to keep safe from harming yourself.    Recognize the warning signs:  · Abrupt changes in personality, positive or negative- including increase in energy   · Giving away possessions  · Change in eating patterns- significant weight changes-  positive or negative  · Change in sleeping patterns- unable to sleep or sleeping all the time   · Unwillingness or inability to communicate  · Depression  · Unusual sadness, discouragement and loneliness  · Talk of wanting to die  · Neglect of personal appearance   · Rebelliousness- reckless behavior  · Withdrawal from people/activities they love  · Confusion- inability to concentrate     If you or a loved one observes any of these behaviors or has concerns about self-harm, here's  what you can do:  · Talk about it- your feelings and reasons for harming yourself  · Remove any means that you might use to hurt yourself (examples: pills, rope, extension cords, firearm)  · Get professional help from the community (Mental Health, Substance Abuse, psychological counseling)  · Do not be alone:Call your Safe Contact- someone whom you trust who will be there for you.  · Call your local CRISIS HOTLINE 183-7596 or 222-403-0658  · Call your local Children's Mobile Crisis Response Team [place] (701) 183-7480 or [employer]  · Call the toll free National Suicide Prevention Hotlines   · National Suicide Prevention Lifeline 125-222-MKYY (2381)  · National Hope Line Network 800-SUICIDE (603-1254)      Discharge Instructions per Jaycee Westfall D.O.    F/u with pcp/dc clinic in 1 week  Cardiology as scheduled    DIET: regular     ACTIVITY: as tolerated    DIAGNOSIS: symptomatic bradycardia, headache, nicotine withdrawal    Return to ER if increasing headache or dizziness

## 2019-08-25 NOTE — DISCHARGE SUMMARY
Discharge Summary    CHIEF COMPLAINT ON ADMISSION  Chief Complaint   Patient presents with   • RLQ Pain   • Flank Pain   • Dizziness       Reason for Admission  follow up     Admission Date  8/20/2019    CODE STATUS  Full Code    HPI & HOSPITAL COURSE  This is a 48 y.o. female here with h/o bipolar disorder not currently on rx, recent UTI on abx, now admitted for symptomatic bradycardia and nausea.  She was seen by cardiology with recommendation for pacemaker placement.  Due to recent urinary tract infection with partial treatment and ongoing dysuria she did require 3 days of IV antibiotics prior to placement of pacemaker.  Urine culture as well as blood culture remain negative.    She is tolerating pacemaker, with paced rhythm.  She reports minimal left chest wall tenderness at insertion site.  She reports temporal headache which has improved on discharge.  She has been cleared for discharge by cardiology.    Therefore, she is discharged in good and stable condition to home with close outpatient follow-up.    The patient met 2-midnight criteria for an inpatient stay at the time of discharge.    Discharge Date  August 25    FOLLOW UP ITEMS POST DISCHARGE  F/u with pcp/dc clinic in 1 week  Cardiology as scheduled    DISCHARGE DIAGNOSES  Active Problems:    UTI (urinary tract infection) POA: Yes    Right lower quadrant abdominal pain POA: Yes    Bradycardia POA: Yes    Bipolar affective disorder in remission (HCC) POA: Yes    Chronic pain POA: Yes    Tobacco abuse POA: Yes    Acid reflux POA: Unknown    Headache POA: Unknown  Resolved Problems:    * No resolved hospital problems. *      FOLLOW UP  Future Appointments   Date Time Provider Department Center   9/23/2019  8:15 AM PACER CHECK-[facility] None     [address] [address] [phone]    PLEASE WALK IN OR CALL TO SCHEDULE AN APPOINTMENT WITH A NEW PRIMARY CARE DOCTOR. THANK YOU.      MEDICATIONS ON  "DISCHARGE     Medication List      START taking these medications      Instructions   ibuprofen 400 MG Tabs  Commonly known as:  MOTRIN   Take 1 Tab by mouth every 6 hours as needed for Mild Pain.  Dose:  400 mg     nicotine 14 MG/24HR Pt24  Commonly known as:  NICODERM   Apply 1 Patch to skin as directed every 24 hours.  Dose:  1 Patch     oxyCODONE immediate-release 5 MG Tabs  Commonly known as:  ROXICODONE   Take 0.5 Tabs by mouth every 3 hours as needed for up to 5 days.  Dose:  2.5 mg        CONTINUE taking these medications      Instructions   cefdinir 300 MG Caps  Commonly known as:  OMNICEF   Take 1 Cap by mouth 2 times a day.  Dose:  300 mg     ondansetron 4 MG Tbdp  Commonly known as:  ZOFRAN ODT   Take 1 Tab by mouth every 8 hours as needed.  Dose:  4 mg        STOP taking these medications    oxyCODONE-acetaminophen 5-325 MG Tabs  Commonly known as:  PERCOCET            Allergies  Allergies   Allergen Reactions   • Doxycycline Shortness of Breath     \"Chest pain\"   • Latex Rash     States rash on hands with latex gloves       DIET  Orders Placed This Encounter   Procedures   • Diet Order Cardiac     Standing Status:   Standing     Number of Occurrences:   1     Order Specific Question:   Diet:     Answer:   Cardiac [6]       ACTIVITY  As tolerated.  Weight bearing as tolerated    CONSULTATIONS  Cardiology    PROCEDURES  Pacemaker placed    LABORATORY  Lab Results   Component Value Date    SODIUM 141 08/24/2019    POTASSIUM 4.2 08/24/2019    CHLORIDE 108 08/24/2019    CO2 26 08/24/2019    GLUCOSE 105 (H) 08/24/2019    BUN 12 08/24/2019    CREATININE 0.92 08/24/2019        Lab Results   Component Value Date    WBC 8.6 08/24/2019    HEMOGLOBIN 13.4 08/24/2019    HEMATOCRIT 40.1 08/24/2019    PLATELETCT 236 08/24/2019        Total time of the discharge process exceeds 45 minutes.  "

## 2019-08-26 LAB
BACTERIA BLD CULT: NORMAL
BACTERIA BLD CULT: NORMAL
SIGNIFICANT IND 70042: NORMAL
SIGNIFICANT IND 70042: NORMAL
SITE SITE: NORMAL
SITE SITE: NORMAL
SOURCE SOURCE: NORMAL
SOURCE SOURCE: NORMAL

## 2019-08-29 NOTE — PROCEDURES
[facility] PROCEDURE NOTE    PROCEDURE PERFORMED: Permanent Pacemaker Implantation    DATE OF SERVICE: 8/23/2019    : Lazarus Schultz MD    ASSISTANT: None    ANESTHESIA: Local and moderate sedation (start time 445 PM, stop time 530 PM)    EBL: 30 cc    SPECIMENS: None    INDICATION(S):  Sick sinus syndrome    DESCRIPTION OF PROCEDURE:  After informed written consent, the patient was brought to the electrophysiology lab in the fasting, unsedated state. The patient was prepped and draped in the usual sterile fashion. The procedure was performed under moderate sedation with local anesthetic. A left upper extremity venogram was performed, demonstrating a patent subclavian vein. A left infraclavicular incision was made with a scalpel and the pectoral device pocket was created using a combination of blunt dissection and electrocautery. The modified Seldinger technique was used to gain access to the left axillary vein. A peel-away hemostasis sheath was placed in the vein. Under fluoroscopic guidance, the pacemaker leads were introduced into the heart. The ventricular lead was advanced to the RVOT and then lowered into position at the RV apex. The atrial lead was positioned on R atrial appendage. The leads were tested and had satisfactory sensing and pacing parameters. High output ventricular pacing did not produce extracardiac stimulation. The leads were sutured to the underlying pectoral muscle with interrupted silk over a silastic suture sleeve. The device pocket was irrigated with antibiotic solution, inspected, and no bleeding was seen. The leads were connected to the pacemaker pulse generator and the device was inserted into the pocket. The wound was closed with three layers of absorbable sutures. Following recovery from sedation, the patient was transferred to a monitored bed in good condition.     IMPLANTED DEVICE INFORMATION:  Pulse generator is a MDT model W3DR01  Serial # [id number]    LEAD  INFORMATION:  1)Right atrial lead is a MDT model #5076-52 , serial #[id number], P wave 2.8 millivolts, threshold 1.25 Volts at 0.5 milliseconds, pacing impedance 608 Ohms.    2)Right ventricular lead is a MDT model #5076-58, serial #[id number] ,R wave 10.25 millivolts, threshold 0.875Volts at 0.5 milliseconds, pacing impedance 1102 Ohms.    DEVICE PROGRAMMING:  AAIR <-> DDDR 60 -140 ppm    FLUOROSCOPY TIME: 2.5 minutes    IMPRESSIONS:  1. Successful dual chamber pacemaker implantation    RECOMMENDATIONS:  1. Transfer to monitored bed  2. Chest x-ray  3. Device interrogation prior to hospital discharge  4. Followup in device clinic

## 2019-09-03 ENCOUNTER — HOSPITAL ENCOUNTER (EMERGENCY)
Facility: MEDICAL CENTER | Age: 49
End: 2019-09-03
Attending: EMERGENCY MEDICINE
Payer: MEDICAID

## 2019-09-03 ENCOUNTER — APPOINTMENT (OUTPATIENT)
Dept: RADIOLOGY | Facility: MEDICAL CENTER | Age: 49
End: 2019-09-03
Attending: EMERGENCY MEDICINE
Payer: MEDICAID

## 2019-09-03 VITALS
OXYGEN SATURATION: 99 % | HEART RATE: 74 BPM | SYSTOLIC BLOOD PRESSURE: 103 MMHG | BODY MASS INDEX: 22.62 KG/M2 | HEIGHT: 68 IN | TEMPERATURE: 97.9 F | WEIGHT: 149.25 LBS | DIASTOLIC BLOOD PRESSURE: 73 MMHG | RESPIRATION RATE: 18 BRPM

## 2019-09-03 DIAGNOSIS — R07.89 CHEST WALL PAIN: ICD-10-CM

## 2019-09-03 LAB
ALBUMIN SERPL BCP-MCNC: 4.1 G/DL (ref 3.2–4.9)
ALBUMIN/GLOB SERPL: 1.2 G/DL
ALP SERPL-CCNC: 63 U/L (ref 30–99)
ALT SERPL-CCNC: 11 U/L (ref 2–50)
ANION GAP SERPL CALC-SCNC: 9 MMOL/L (ref 0–11.9)
AST SERPL-CCNC: 12 U/L (ref 12–45)
BASOPHILS # BLD AUTO: 1.1 % (ref 0–1.8)
BASOPHILS # BLD: 0.08 K/UL (ref 0–0.12)
BILIRUB SERPL-MCNC: 0.4 MG/DL (ref 0.1–1.5)
BUN SERPL-MCNC: 14 MG/DL (ref 8–22)
CALCIUM SERPL-MCNC: 9.1 MG/DL (ref 8.5–10.5)
CHLORIDE SERPL-SCNC: 107 MMOL/L (ref 96–112)
CO2 SERPL-SCNC: 27 MMOL/L (ref 20–33)
CREAT SERPL-MCNC: 1.01 MG/DL (ref 0.5–1.4)
EKG IMPRESSION: NORMAL
EOSINOPHIL # BLD AUTO: 0.34 K/UL (ref 0–0.51)
EOSINOPHIL NFR BLD: 4.5 % (ref 0–6.9)
ERYTHROCYTE [DISTWIDTH] IN BLOOD BY AUTOMATED COUNT: 46.7 FL (ref 35.9–50)
GLOBULIN SER CALC-MCNC: 3.5 G/DL (ref 1.9–3.5)
GLUCOSE SERPL-MCNC: 86 MG/DL (ref 65–99)
HCT VFR BLD AUTO: 38.9 % (ref 37–47)
HGB BLD-MCNC: 12.1 G/DL (ref 12–16)
IMM GRANULOCYTES # BLD AUTO: 0.01 K/UL (ref 0–0.11)
IMM GRANULOCYTES NFR BLD AUTO: 0.1 % (ref 0–0.9)
LYMPHOCYTES # BLD AUTO: 3.04 K/UL (ref 1–4.8)
LYMPHOCYTES NFR BLD: 40.1 % (ref 22–41)
MCH RBC QN AUTO: 30.3 PG (ref 27–33)
MCHC RBC AUTO-ENTMCNC: 31.1 G/DL (ref 33.6–35)
MCV RBC AUTO: 97.5 FL (ref 81.4–97.8)
MONOCYTES # BLD AUTO: 0.61 K/UL (ref 0–0.85)
MONOCYTES NFR BLD AUTO: 8 % (ref 0–13.4)
NEUTROPHILS # BLD AUTO: 3.5 K/UL (ref 2–7.15)
NEUTROPHILS NFR BLD: 46.2 % (ref 44–72)
NRBC # BLD AUTO: 0 K/UL
NRBC BLD-RTO: 0 /100 WBC
PLATELET # BLD AUTO: 379 K/UL (ref 164–446)
PMV BLD AUTO: 8.1 FL (ref 9–12.9)
POTASSIUM SERPL-SCNC: 3.5 MMOL/L (ref 3.6–5.5)
PROT SERPL-MCNC: 7.6 G/DL (ref 6–8.2)
RBC # BLD AUTO: 3.99 M/UL (ref 4.2–5.4)
SODIUM SERPL-SCNC: 143 MMOL/L (ref 135–145)
TROPONIN T SERPL-MCNC: 15 NG/L (ref 6–19)
WBC # BLD AUTO: 7.6 K/UL (ref 4.8–10.8)

## 2019-09-03 PROCEDURE — 93005 ELECTROCARDIOGRAM TRACING: CPT | Performed by: EMERGENCY MEDICINE

## 2019-09-03 PROCEDURE — 71045 X-RAY EXAM CHEST 1 VIEW: CPT

## 2019-09-03 PROCEDURE — 93005 ELECTROCARDIOGRAM TRACING: CPT

## 2019-09-03 PROCEDURE — 700102 HCHG RX REV CODE 250 W/ 637 OVERRIDE(OP): Performed by: EMERGENCY MEDICINE

## 2019-09-03 PROCEDURE — 84484 ASSAY OF TROPONIN QUANT: CPT

## 2019-09-03 PROCEDURE — 80053 COMPREHEN METABOLIC PANEL: CPT

## 2019-09-03 PROCEDURE — A9270 NON-COVERED ITEM OR SERVICE: HCPCS | Performed by: EMERGENCY MEDICINE

## 2019-09-03 PROCEDURE — 99284 EMERGENCY DEPT VISIT MOD MDM: CPT

## 2019-09-03 PROCEDURE — 85025 COMPLETE CBC W/AUTO DIFF WBC: CPT

## 2019-09-03 RX ORDER — IBUPROFEN 200 MG
1000 TABLET ORAL EVERY 6 HOURS PRN
Status: SHIPPED | COMMUNITY
End: 2023-01-03

## 2019-09-03 RX ORDER — OXYCODONE HYDROCHLORIDE 5 MG/1
5 TABLET ORAL EVERY 8 HOURS PRN
Status: SHIPPED | COMMUNITY
End: 2023-01-03

## 2019-09-03 RX ORDER — OXYCODONE HYDROCHLORIDE AND ACETAMINOPHEN 5; 325 MG/1; MG/1
1 TABLET ORAL EVERY 8 HOURS PRN
Status: SHIPPED | COMMUNITY
End: 2023-01-03

## 2019-09-03 RX ORDER — ONDANSETRON 4 MG/1
4 TABLET, ORALLY DISINTEGRATING ORAL EVERY 8 HOURS PRN
Status: SHIPPED | COMMUNITY
End: 2023-01-03

## 2019-09-03 RX ORDER — OXYCODONE AND ACETAMINOPHEN 10; 325 MG/1; MG/1
1 TABLET ORAL ONCE
Status: COMPLETED | OUTPATIENT
Start: 2019-09-03 | End: 2019-09-03

## 2019-09-03 RX ADMIN — OXYCODONE HYDROCHLORIDE AND ACETAMINOPHEN 1 TABLET: 10; 325 TABLET ORAL at 21:16

## 2019-09-04 NOTE — ED NOTES
Med Rec complete per Pt at bedside  Allergies Reviewed    Pt completed a 7 day course of CEFDINIR on 8/25

## 2019-09-04 NOTE — ED PROVIDER NOTES
ED Provider Note    Scribed for Dr. Bolivar Montero M.D. by Simona Melendez. 9/3/2019  7:48 PM    Primary care provider: Pcp Pt States None  Means of arrival: Walk in  History obtained from: Patient  History limited by: None    CHIEF COMPLAINT  Chief Complaint   Patient presents with   • Chest Pain   • Post-Op Complications       HPI  Janina Lantigua is a 48 y.o. female who presents to the Emergency Department for right sided chest pain onset 9 days ago. She describes her chest pain as sharp and radiates to the center of her chest when she moves her shoulder. Patient's chest pain is exacerbated by movement. Patient associates near syncope secondary to shortness of breath while walking. Patient notes that she was recently in the hospital 9 days ago for a pace maker placement surgery. She was prescribed Oxycodone and has still been taking it for her pain along with ibuprofen. Patient does not report any other pains or symptoms at this time.     REVIEW OF SYSTEMS  Pertinent positives include shortness of breath, near syncope, and right side chest pain. Pertinent negatives include no other symptoms. As above, all other systems reviewed and are negative.   See HPI for further details.     PAST MEDICAL HISTORY   has a past medical history of Asthma, Bipolar 2 disorder, major depressive episode (HCC), Fibromyalgia (2010), Lumbago, Major depression (1996), Psychiatric disorder, Psychiatric disorder, and PTSD (post-traumatic stress disorder).    SURGICAL HISTORY   has a past surgical history that includes primary c section; other; and pacemaker insertion (08/23/2019).    SOCIAL HISTORY  Social History     Tobacco Use   • Smoking status: Current Every Day Smoker     Packs/day: 0.50     Years: 30.00     Pack years: 15.00     Types: Cigarettes   • Smokeless tobacco: Never Used   Substance Use Topics   • Alcohol use: Never     Frequency: Never     Comment: 1-2 a month   • Drug use: No      Social History     Substance and  "Sexual Activity   Drug Use No       FAMILY HISTORY  Family History   Problem Relation Age of Onset   • Cancer Mother 43        melanoma,  from cancer spread at age 48.    • Bipolar disorder Father    • Alzheimer's Disease Maternal Grandmother    • Cancer Paternal Grandmother         breast cancer   • Cancer Paternal Grandfather         lung cancer       CURRENT MEDICATIONS  Current Outpatient Medications:   •  ibuprofen (MOTRIN) 400 MG Tab, Take 1 Tab by mouth every 6 hours as needed for Mild Pain., Disp: 30 Tab, Rfl: 0  •  nicotine (NICODERM) 14 MG/24HR PATCH 24 HR, Apply 1 Patch to skin as directed every 24 hours., Disp: 30 Patch, Rfl: 0  •  cefdinir (OMNICEF) 300 MG Cap, Take 1 Cap by mouth 2 times a day., Disp: 14 Cap, Rfl: 0  •  ondansetron (ZOFRAN ODT) 4 MG TABLET DISPERSIBLE, Take 1 Tab by mouth every 8 hours as needed., Disp: 10 Tab, Rfl: 0      ALLERGIES  Allergies   Allergen Reactions   • Doxycycline Shortness of Breath     \"Chest pain\"   • Latex Rash     States rash on hands with latex gloves       PHYSICAL EXAM  VITAL SIGNS: /83   Pulse 87   Temp 36.6 °C (97.9 °F) (Temporal)   Resp 16   Ht 1.727 m (5' 8\")   Wt 67.7 kg (149 lb 4 oz)   SpO2 99%   BMI 22.69 kg/m²     Constitutional: Well developed, Well nourished, mild distress, Non-toxic appearance.   HENT: Normocephalic, Atraumatic, Bilateral external ears normal, Oropharynx moist, No oral exudates.   Eyes: PERRLA, EOMI, Conjunctiva normal, No discharge.   Neck: No tenderness, Supple, No stridor.   Lymphatic: No lymphadenopathy noted.   Cardiovascular:  Normal heart rate, Normal rhythm. Well healing pacemaker incision.  Thorax & Lungs: Right chest tenderness, Clear to auscultation bilaterally, No respiratory distress, No wheezing, No crackles.   Abdomen: Soft, No tenderness, No masses, No pulsatile masses.   Skin: Warm, Dry, No erythema, No rash.   Extremities:, No edema No cyanosis.   Musculoskeletal: No major deformities noted.  Intact " distal pulses  Neurologic: Awake, alert. Moves all extremities spontaneously.  Psychiatric: Affect normal, Judgment normal, Mood normal.     LABS  Results for orders placed or performed during the hospital encounter of 09/03/19   CBC with Differential   Result Value Ref Range    WBC 7.6 4.8 - 10.8 K/uL    RBC 3.99 (L) 4.20 - 5.40 M/uL    Hemoglobin 12.1 12.0 - 16.0 g/dL    Hematocrit 38.9 37.0 - 47.0 %    MCV 97.5 81.4 - 97.8 fL    MCH 30.3 27.0 - 33.0 pg    MCHC 31.1 (L) 33.6 - 35.0 g/dL    RDW 46.7 35.9 - 50.0 fL    Platelet Count 379 164 - 446 K/uL    MPV 8.1 (L) 9.0 - 12.9 fL    Neutrophils-Polys 46.20 44.00 - 72.00 %    Lymphocytes 40.10 22.00 - 41.00 %    Monocytes 8.00 0.00 - 13.40 %    Eosinophils 4.50 0.00 - 6.90 %    Basophils 1.10 0.00 - 1.80 %    Immature Granulocytes 0.10 0.00 - 0.90 %    Nucleated RBC 0.00 /100 WBC    Neutrophils (Absolute) 3.50 2.00 - 7.15 K/uL    Lymphs (Absolute) 3.04 1.00 - 4.80 K/uL    Monos (Absolute) 0.61 0.00 - 0.85 K/uL    Eos (Absolute) 0.34 0.00 - 0.51 K/uL    Baso (Absolute) 0.08 0.00 - 0.12 K/uL    Immature Granulocytes (abs) 0.01 0.00 - 0.11 K/uL    NRBC (Absolute) 0.00 K/uL   Complete Metabolic Panel (CMP)   Result Value Ref Range    Sodium 143 135 - 145 mmol/L    Potassium 3.5 (L) 3.6 - 5.5 mmol/L    Chloride 107 96 - 112 mmol/L    Co2 27 20 - 33 mmol/L    Anion Gap 9.0 0.0 - 11.9    Glucose 86 65 - 99 mg/dL    Bun 14 8 - 22 mg/dL    Creatinine 1.01 0.50 - 1.40 mg/dL    Calcium 9.1 8.5 - 10.5 mg/dL    AST(SGOT) 12 12 - 45 U/L    ALT(SGPT) 11 2 - 50 U/L    Alkaline Phosphatase 63 30 - 99 U/L    Total Bilirubin 0.4 0.1 - 1.5 mg/dL    Albumin 4.1 3.2 - 4.9 g/dL    Total Protein 7.6 6.0 - 8.2 g/dL    Globulin 3.5 1.9 - 3.5 g/dL    A-G Ratio 1.2 g/dL   Troponin   Result Value Ref Range    Troponin T 15 6 - 19 ng/L   ESTIMATED GFR   Result Value Ref Range    GFR If African American >60 >60 mL/min/1.73 m 2    GFR If Non African American 59 (A) >60 mL/min/1.73 m 2   EKG (NOW)    Result Value Ref Range    Report       [facility] Emergency Dept.    Test Date:  2019  Pt Name:    WILL RAMIREZ              Department: ER  MRN:        [id number]                      Room:  Gender:     Female                       Technician: [id number]  :        1970                   Requested By:ER TRIAGE PROTOCOL  Order #:    [id number]                    Reading MD:    Measurements  Intervals                                Axis  Rate:       71                           P:  UT:         188                          QRS:        64  QRSD:       84                           T:          3  QT:         364  QTc:        396    Interpretive Statements  ATRIAL-PACED RHYTHM  BORDERLINE T ABNORMALITIES, DIFFUSE LEADS  Compared to ECG 2019 08:01:52  T-wave abnormality now present         All labs reviewed by me.    EKG  Interpreted by me as noted above.    RADIOLOGY  DX-CHEST-PORTABLE (1 VIEW)   Final Result         Minimal left basilar opacity, likely atelectasis.        The radiologist's interpretation of all radiological studies have been reviewed by me.    COURSE & MEDICAL DECISION MAKING  Pertinent Labs & Imaging studies reviewed. (See chart for details)    7:48 PM - Patient seen and examined at bedside. Patient presents with right side chest pain. Upon examination, the patient has right side chest tenderness. I informed the patient the need for labs and radiology to rule out any emergent processes. Currently awaiting labs and radiology results before deciding if intervention is necessary. Ordered DX-Chest, Estimated GFR, CBC with differential, CMP, EKG and Troponin to evaluate her symptoms. The differential diagnoses include but are not limited to: Chest wall pain, pacemaker complication    8:56 PM - I reviewed the labs and radiology results as noted above.    9:11 PM - Patient was reevaluated at bedside. Discussed lab and radiology results with the patient and informed them of  the results as noted above. The patient continued to complain of her pain so I ordered Percocet-10 10-325mg. I then discussed my plan for discharge. Patient was given return precautions and welcomed to return to the ED. Patient understood and verbalized agreement.         Decision Making:  Patient's work-up is unremarkable she seems to have some chest wall pain but no other complication at this time will be discharged home    DISPOSITION:  Patient will be discharged home in stable condition.    FINAL IMPRESSION  1. Chest wall pain          Simona JOHNSON (Scribe), am scribing for, and in the presence of, Bolivar Montero M.D..    Electronically signed by: Simona Melendez (Scribe), 9/3/2019    IBolivar M.D. personally performed the services described in this documentation, as scribed by Simona Melendez in my presence, and it is both accurate and complete. C    The note accurately reflects work and decisions made by me.  Bolivar Montero  9/3/2019  11:24 PM

## 2019-09-04 NOTE — ED NOTES
Pt ambulated from lobby to Red 5 without assistance, tolerated well. Pt is now sitting in bed in hospital gown with even and unlabored breaths, in no apparent distress at this time. Will continue to monitor.

## 2019-09-04 NOTE — ED TRIAGE NOTES
"Chief Complaint   Patient presents with   • Chest Pain   • Post-Op Complications     Ht 1.727 m (5' 8\")   Wt 67.7 kg (149 lb 4 oz)   BMI 22.69 kg/m²       Pt had a maker placed and was discharged on 8/25. States that she has been having increasing chest pain since. Pt states that she was told no to remove the bandage for one week.  States that deep breaths hurt.     Denies fever/chills.    Has SOB, dizziness, and increased chest pain.    EKG and protocol ordered.   "

## 2019-09-04 NOTE — ED NOTES
Pt sitting up in bed with even and unlabored breaths. No distress is noted at this time and pt's daughter is at bedside. Pt updated regarding status waiting for all test results to come back, demonstrated understanding. Will continue to monitor.

## 2019-09-10 ENCOUNTER — NON-PROVIDER VISIT (OUTPATIENT)
Dept: CARDIOLOGY | Facility: MEDICAL CENTER | Age: 49
End: 2019-09-10
Payer: MEDICAID

## 2019-09-10 DIAGNOSIS — Z95.0 CARDIAC PACEMAKER IN SITU: ICD-10-CM

## 2019-09-10 PROCEDURE — 93280 PM DEVICE PROGR EVAL DUAL: CPT | Performed by: INTERNAL MEDICINE

## 2019-09-10 NOTE — NON-PROVIDER
S/p new pacemaker, implanted on 8-. Appropriate device function demonstrated. Wound site appears clear. Advised to watch for redness, swelling, oozing or fever. Pt verbalized understanding. See back in 6 weeks for final testing.

## 2020-08-12 ENCOUNTER — APPOINTMENT (OUTPATIENT)
Dept: URGENT CARE | Facility: CLINIC | Age: 50
End: 2020-08-12
Payer: MEDICAID

## 2020-08-12 ENCOUNTER — APPOINTMENT (OUTPATIENT)
Dept: RADIOLOGY | Facility: MEDICAL CENTER | Age: 50
End: 2020-08-12
Attending: EMERGENCY MEDICINE
Payer: MEDICAID

## 2020-08-12 ENCOUNTER — HOSPITAL ENCOUNTER (EMERGENCY)
Facility: MEDICAL CENTER | Age: 50
End: 2020-08-12
Attending: EMERGENCY MEDICINE
Payer: MEDICAID

## 2020-08-12 VITALS
HEIGHT: 67 IN | TEMPERATURE: 98.2 F | BODY MASS INDEX: 23.32 KG/M2 | DIASTOLIC BLOOD PRESSURE: 72 MMHG | HEART RATE: 63 BPM | RESPIRATION RATE: 16 BRPM | WEIGHT: 148.59 LBS | OXYGEN SATURATION: 100 % | SYSTOLIC BLOOD PRESSURE: 122 MMHG

## 2020-08-12 DIAGNOSIS — R19.7 DIARRHEA, UNSPECIFIED TYPE: ICD-10-CM

## 2020-08-12 LAB
ALBUMIN SERPL BCP-MCNC: 4.4 G/DL (ref 3.2–4.9)
ALBUMIN/GLOB SERPL: 1.6 G/DL
ALP SERPL-CCNC: 72 U/L (ref 30–99)
ALT SERPL-CCNC: 14 U/L (ref 2–50)
ANION GAP SERPL CALC-SCNC: 12 MMOL/L (ref 7–16)
APPEARANCE UR: CLEAR
AST SERPL-CCNC: 18 U/L (ref 12–45)
BASOPHILS # BLD AUTO: 0.6 % (ref 0–1.8)
BASOPHILS # BLD: 0.04 K/UL (ref 0–0.12)
BILIRUB SERPL-MCNC: 0.3 MG/DL (ref 0.1–1.5)
BILIRUB UR QL STRIP.AUTO: NEGATIVE
BUN SERPL-MCNC: 8 MG/DL (ref 8–22)
CALCIUM SERPL-MCNC: 9.4 MG/DL (ref 8.5–10.5)
CHLORIDE SERPL-SCNC: 105 MMOL/L (ref 96–112)
CO2 SERPL-SCNC: 27 MMOL/L (ref 20–33)
COLOR UR: YELLOW
CREAT SERPL-MCNC: 0.92 MG/DL (ref 0.5–1.4)
EKG IMPRESSION: NORMAL
EOSINOPHIL # BLD AUTO: 0.15 K/UL (ref 0–0.51)
EOSINOPHIL NFR BLD: 2.3 % (ref 0–6.9)
ERYTHROCYTE [DISTWIDTH] IN BLOOD BY AUTOMATED COUNT: 46.4 FL (ref 35.9–50)
GLOBULIN SER CALC-MCNC: 2.7 G/DL (ref 1.9–3.5)
GLUCOSE SERPL-MCNC: 85 MG/DL (ref 65–99)
GLUCOSE UR STRIP.AUTO-MCNC: NEGATIVE MG/DL
HCG SERPL QL: NEGATIVE
HCT VFR BLD AUTO: 43.8 % (ref 37–47)
HGB BLD-MCNC: 14.6 G/DL (ref 12–16)
IMM GRANULOCYTES # BLD AUTO: 0.01 K/UL (ref 0–0.11)
IMM GRANULOCYTES NFR BLD AUTO: 0.2 % (ref 0–0.9)
KETONES UR STRIP.AUTO-MCNC: NEGATIVE MG/DL
LEUKOCYTE ESTERASE UR QL STRIP.AUTO: NEGATIVE
LIPASE SERPL-CCNC: 27 U/L (ref 11–82)
LYMPHOCYTES # BLD AUTO: 2.52 K/UL (ref 1–4.8)
LYMPHOCYTES NFR BLD: 38.1 % (ref 22–41)
MCH RBC QN AUTO: 31.9 PG (ref 27–33)
MCHC RBC AUTO-ENTMCNC: 33.3 G/DL (ref 33.6–35)
MCV RBC AUTO: 95.8 FL (ref 81.4–97.8)
MICRO URNS: NORMAL
MONOCYTES # BLD AUTO: 0.43 K/UL (ref 0–0.85)
MONOCYTES NFR BLD AUTO: 6.5 % (ref 0–13.4)
NEUTROPHILS # BLD AUTO: 3.46 K/UL (ref 2–7.15)
NEUTROPHILS NFR BLD: 52.3 % (ref 44–72)
NITRITE UR QL STRIP.AUTO: NEGATIVE
NRBC # BLD AUTO: 0 K/UL
NRBC BLD-RTO: 0 /100 WBC
PH UR STRIP.AUTO: 8 [PH] (ref 5–8)
PLATELET # BLD AUTO: 245 K/UL (ref 164–446)
PMV BLD AUTO: 8.4 FL (ref 9–12.9)
POTASSIUM SERPL-SCNC: 3.8 MMOL/L (ref 3.6–5.5)
PROT SERPL-MCNC: 7.1 G/DL (ref 6–8.2)
PROT UR QL STRIP: NEGATIVE MG/DL
RBC # BLD AUTO: 4.57 M/UL (ref 4.2–5.4)
RBC UR QL AUTO: NEGATIVE
SODIUM SERPL-SCNC: 144 MMOL/L (ref 135–145)
SP GR UR STRIP.AUTO: 1.01
UROBILINOGEN UR STRIP.AUTO-MCNC: 0.2 MG/DL
WBC # BLD AUTO: 6.6 K/UL (ref 4.8–10.8)

## 2020-08-12 PROCEDURE — 700111 HCHG RX REV CODE 636 W/ 250 OVERRIDE (IP): Performed by: EMERGENCY MEDICINE

## 2020-08-12 PROCEDURE — 84703 CHORIONIC GONADOTROPIN ASSAY: CPT

## 2020-08-12 PROCEDURE — 85025 COMPLETE CBC W/AUTO DIFF WBC: CPT

## 2020-08-12 PROCEDURE — 83690 ASSAY OF LIPASE: CPT

## 2020-08-12 PROCEDURE — 93005 ELECTROCARDIOGRAM TRACING: CPT

## 2020-08-12 PROCEDURE — 74018 RADEX ABDOMEN 1 VIEW: CPT

## 2020-08-12 PROCEDURE — 93005 ELECTROCARDIOGRAM TRACING: CPT | Performed by: EMERGENCY MEDICINE

## 2020-08-12 PROCEDURE — 99284 EMERGENCY DEPT VISIT MOD MDM: CPT

## 2020-08-12 PROCEDURE — 36415 COLL VENOUS BLD VENIPUNCTURE: CPT

## 2020-08-12 PROCEDURE — 80053 COMPREHEN METABOLIC PANEL: CPT

## 2020-08-12 PROCEDURE — 81003 URINALYSIS AUTO W/O SCOPE: CPT

## 2020-08-12 PROCEDURE — 96372 THER/PROPH/DIAG INJ SC/IM: CPT

## 2020-08-12 RX ORDER — DICYCLOMINE HYDROCHLORIDE 10 MG/ML
20 INJECTION INTRAMUSCULAR ONCE
Status: COMPLETED | OUTPATIENT
Start: 2020-08-12 | End: 2020-08-12

## 2020-08-12 RX ORDER — ONDANSETRON 4 MG/1
4 TABLET, ORALLY DISINTEGRATING ORAL EVERY 6 HOURS PRN
Qty: 10 TAB | Refills: 0 | Status: SHIPPED
Start: 2020-08-12

## 2020-08-12 RX ORDER — ONDANSETRON 4 MG/1
4 TABLET, ORALLY DISINTEGRATING ORAL ONCE
Status: COMPLETED | OUTPATIENT
Start: 2020-08-12 | End: 2020-08-12

## 2020-08-12 RX ORDER — DICYCLOMINE HYDROCHLORIDE 10 MG/1
10 CAPSULE ORAL
Qty: 60 CAP | Refills: 0 | Status: SHIPPED
Start: 2020-08-12

## 2020-08-12 RX ADMIN — DICYCLOMINE HYDROCHLORIDE 20 MG: 20 INJECTION, SOLUTION INTRAMUSCULAR at 17:15

## 2020-08-12 RX ADMIN — ONDANSETRON 4 MG: 4 TABLET, ORALLY DISINTEGRATING ORAL at 17:14

## 2020-08-12 ASSESSMENT — FIBROSIS 4 INDEX: FIB4 SCORE: 0.45

## 2020-08-12 NOTE — ED TRIAGE NOTES
"Janina Lantigua 49 y.o. female ambulatory to triage for     Chief Complaint   Patient presents with   • Abdominal Pain     R sided lower abdominal pain x 2 weeks.  Pt reports abdominal pain, bloating and gas, worse when walking.  Denies n/v.   • Diarrhea     intermittent every 3-4 months, diarrhea on Sunday, last BM was early Monday morning \"just mucus\"     Pt reports exposure to covid + coworker 1 month ago but tested negative 2 weeks ago.  Pt denies cough, fever, sob or loss of taste/small.  Pt in NAD, VSS  /85   Pulse 77   Temp 36.5 °C (97.7 °F) (Temporal)   Resp 18   Ht 1.702 m (5' 7\")   Wt 67.4 kg (148 lb 9.4 oz)   SpO2 100%   BMI 23.27 kg/m²   Pt returned to [facility] to await bed assignment.   Advised to return to the triage desk for any changes/concerns.  "

## 2020-08-13 NOTE — ED PROVIDER NOTES
"ED Provider Note    CHIEF COMPLAINT  Chief Complaint   Patient presents with   • Abdominal Pain     R sided lower abdominal pain x 2 weeks.  Pt reports abdominal pain, bloating and gas, worse when walking.  Denies n/v.   • Diarrhea     intermittent every 3-4 months, diarrhea on Sunday, last BM was early Monday morning \"just mucus\"       HPI  Janina Lantigua is a 49 y.o. female here for evaluation of abdominal pain.  Patient states that she has diffuse abdominal pain, that has been present for about 2 weeks.  Patient has no fever chills, no vomiting, but admits to diarrhea every day.  This is been ongoing for about 2 to 3 weeks.  Patient states that she has no blood in it, but it does cause her some cramping abdominal pain.  The abdominal pain comes and goes, and is worse right before the diarrheal episode.  It is better afterwards.  She has no different or strange foods.  She denies a history of the same, she has no ill contacts.  She has only taken Tylenol as directed for the pain or discomfort.    ROS;  Please see HPI  O/W negative     PAST MEDICAL HISTORY   has a past medical history of Asthma, Bipolar 2 disorder, major depressive episode (HCC), Fibromyalgia (2010), Lumbago, Major depression (1996), Psychiatric disorder, Psychiatric disorder, and PTSD (post-traumatic stress disorder).    SOCIAL HISTORY  Social History     Tobacco Use   • Smoking status: Current Every Day Smoker     Packs/day: 0.50     Years: 30.00     Pack years: 15.00     Types: Cigarettes   • Smokeless tobacco: Never Used   Substance and Sexual Activity   • Alcohol use: Never     Frequency: Never     Comment: 1-2 a month   • Drug use: No   • Sexual activity: Not Currently     Partners: Male       SURGICAL HISTORY   has a past surgical history that includes primary c section; other; and pacemaker insertion (08/23/2019).    CURRENT MEDICATIONS  Home Medications    **Home medications have not yet been reviewed for this encounter**   " "      ALLERGIES  Allergies   Allergen Reactions   • Doxycycline Shortness of Breath     \"Chest pain\"   • Latex Rash     States rash on hands with latex gloves       REVIEW OF SYSTEMS  See HPI for further details. Review of systems as above, otherwise all other systems are negative.     PHYSICAL EXAM  VITAL SIGNS: /78   Pulse 66   Temp 36.7 °C (98.1 °F) (Temporal)   Resp 14   Ht 1.702 m (5' 7\")   Wt 67.4 kg (148 lb 9.4 oz)   SpO2 100%   BMI 23.27 kg/m²     Constitutional: Well developed, well nourished. No acute distress.  HEENT: Normocephalic, atraumatic. MMM  Neck: Supple, Full range of motion   Chest/Pulmonary:  No respiratory distress.  Equal expansion   Musculoskeletal: No deformity, no edema, neurovascular intact.   Neuro: Clear speech, appropriate, cooperative, cranial nerves II-XII grossly intact.  Psych: Normal mood and affect      PROCEDURES     MEDICAL RECORD  I have reviewed patient's medical record and pertinent results are listed.    COURSE & MEDICAL DECISION MAKING  I have reviewed any medical record information, laboratory studies and radiographic results as noted above.    5:56 PM  At this time, the patient has no abdominal pain, is nontoxic-appearing, and afebrile.  She is not had any episodes of diarrhea here, have her follow-up, or return if any further issues or concerns.  I spoke to the pt regarding her pain.  She states it is in the lower abdomen, but intermittent.  I offered her a ct scan to evaluate her appendix.  She has declined at this time, and would like to 'try the medications at home.'   On re exam, she has no current pain.     I you have had any blood pressure issues while here in the emergency department, please see your doctor for a further evaluation or work up.    Differential diagnoses include but not limited to: diarrhea Illness, c diff,     This patient presents with diarrhea illness .  At this time, I have counseled the patient/family regarding their medications, " pain control, and follow up.  They will continue their medications, if any, as prescribed.  They will return immediately for any worsening symptoms and/or any other medical concerns.  They will see their doctor, or contact the doctor provided, in 1-2 days for follow up.       FINAL IMPRESSION  Diarrhea illness      Electronically signed by: Josemanuel Greene D.O., 8/12/2020 5:05 PM

## 2020-12-24 ENCOUNTER — APPOINTMENT (OUTPATIENT)
Dept: RADIOLOGY | Facility: MEDICAL CENTER | Age: 50
End: 2020-12-24
Attending: PHYSICIAN ASSISTANT
Payer: MEDICAID

## 2021-11-30 ENCOUNTER — TELEPHONE (OUTPATIENT)
Dept: CARDIOLOGY | Facility: MEDICAL CENTER | Age: 51
End: 2021-11-30

## 2021-12-01 NOTE — TELEPHONE ENCOUNTER
Received remote transmission 11-.  High RA thresholds.  Ap: 35%  :1 %  S/w pt, she has never returned to clinic for device follow up since implant 8-2019 w/SS.  Pt advised that she needs to be seen in clinic and agrees to appt 12-3-2021 at 11am.

## 2022-07-19 ENCOUNTER — NON-PROVIDER VISIT (OUTPATIENT)
Dept: CARDIOLOGY | Facility: MEDICAL CENTER | Age: 52
End: 2022-07-19
Payer: MEDICAID

## 2022-07-19 PROCEDURE — 93294 REM INTERROG EVL PM/LDLS PM: CPT | Performed by: INTERNAL MEDICINE

## 2022-07-19 NOTE — CARDIAC REMOTE MONITOR - SCAN
Device transmission reviewed. Device demonstrated appropriate function.       Electronically Signed by: George Levi M.D.    7/23/2022  3:37 PM

## 2022-10-18 ENCOUNTER — NON-PROVIDER VISIT (OUTPATIENT)
Dept: CARDIOLOGY | Facility: MEDICAL CENTER | Age: 52
End: 2022-10-18
Payer: MEDICAID

## 2022-10-18 PROCEDURE — 93294 REM INTERROG EVL PM/LDLS PM: CPT | Performed by: INTERNAL MEDICINE

## 2022-10-18 NOTE — CARDIAC REMOTE MONITOR - SCAN
Device transmission reviewed. Device demonstrated appropriate function.       Electronically Signed by: Lazarus Schultz M.D.    10/19/2022  8:09 AM

## 2022-12-06 ENCOUNTER — APPOINTMENT (OUTPATIENT)
Dept: RADIOLOGY | Facility: MEDICAL CENTER | Age: 52
End: 2022-12-06
Attending: EMERGENCY MEDICINE
Payer: MEDICAID

## 2022-12-06 ENCOUNTER — HOSPITAL ENCOUNTER (EMERGENCY)
Facility: MEDICAL CENTER | Age: 52
End: 2022-12-06
Attending: EMERGENCY MEDICINE
Payer: MEDICAID

## 2022-12-06 VITALS
OXYGEN SATURATION: 96 % | HEART RATE: 72 BPM | SYSTOLIC BLOOD PRESSURE: 149 MMHG | WEIGHT: 174.6 LBS | RESPIRATION RATE: 18 BRPM | HEIGHT: 67 IN | DIASTOLIC BLOOD PRESSURE: 64 MMHG | BODY MASS INDEX: 27.4 KG/M2 | TEMPERATURE: 98.4 F

## 2022-12-06 DIAGNOSIS — B96.89 ACUTE BACTERIAL SINUSITIS: ICD-10-CM

## 2022-12-06 DIAGNOSIS — J01.90 ACUTE BACTERIAL SINUSITIS: ICD-10-CM

## 2022-12-06 DIAGNOSIS — M54.9 PAIN, UPPER BACK: ICD-10-CM

## 2022-12-06 LAB
APPEARANCE UR: CLEAR
BILIRUB UR QL STRIP.AUTO: ABNORMAL
COLOR UR: ABNORMAL
EKG IMPRESSION: NORMAL
GLUCOSE UR STRIP.AUTO-MCNC: NEGATIVE MG/DL
KETONES UR STRIP.AUTO-MCNC: ABNORMAL MG/DL
LEUKOCYTE ESTERASE UR QL STRIP.AUTO: NEGATIVE
MICRO URNS: ABNORMAL
NITRITE UR QL STRIP.AUTO: NEGATIVE
PH UR STRIP.AUTO: 7 [PH] (ref 5–8)
PROT UR QL STRIP: NEGATIVE MG/DL
RBC UR QL AUTO: NEGATIVE
SP GR UR STRIP.AUTO: 1.02
UROBILINOGEN UR STRIP.AUTO-MCNC: 0.2 MG/DL

## 2022-12-06 PROCEDURE — 99283 EMERGENCY DEPT VISIT LOW MDM: CPT

## 2022-12-06 PROCEDURE — 71045 X-RAY EXAM CHEST 1 VIEW: CPT

## 2022-12-06 PROCEDURE — A9270 NON-COVERED ITEM OR SERVICE: HCPCS | Performed by: EMERGENCY MEDICINE

## 2022-12-06 PROCEDURE — 700102 HCHG RX REV CODE 250 W/ 637 OVERRIDE(OP): Performed by: EMERGENCY MEDICINE

## 2022-12-06 PROCEDURE — 81003 URINALYSIS AUTO W/O SCOPE: CPT

## 2022-12-06 PROCEDURE — 93005 ELECTROCARDIOGRAM TRACING: CPT | Performed by: EMERGENCY MEDICINE

## 2022-12-06 PROCEDURE — 93005 ELECTROCARDIOGRAM TRACING: CPT

## 2022-12-06 RX ORDER — CYCLOBENZAPRINE HCL 10 MG
10 TABLET ORAL 3 TIMES DAILY PRN
Qty: 30 TABLET | Refills: 0 | Status: SHIPPED | OUTPATIENT
Start: 2022-12-06 | End: 2022-12-06 | Stop reason: SDUPTHER

## 2022-12-06 RX ORDER — HYDROCODONE BITARTRATE AND ACETAMINOPHEN 5; 325 MG/1; MG/1
1 TABLET ORAL EVERY 4 HOURS PRN
Qty: 20 TABLET | Refills: 0 | Status: SHIPPED | OUTPATIENT
Start: 2022-12-06 | End: 2022-12-11

## 2022-12-06 RX ORDER — CYCLOBENZAPRINE HCL 10 MG
10 TABLET ORAL 3 TIMES DAILY PRN
Qty: 30 TABLET | Refills: 0 | Status: SHIPPED | OUTPATIENT
Start: 2022-12-06

## 2022-12-06 RX ORDER — HYDROCODONE BITARTRATE AND ACETAMINOPHEN 5; 325 MG/1; MG/1
1 TABLET ORAL EVERY 4 HOURS PRN
Qty: 20 TABLET | Refills: 0 | Status: SHIPPED | OUTPATIENT
Start: 2022-12-06 | End: 2022-12-06 | Stop reason: SDUPTHER

## 2022-12-06 RX ORDER — AMOXICILLIN 500 MG/1
500 TABLET, FILM COATED ORAL 3 TIMES DAILY
Qty: 21 TABLET | Refills: 0 | Status: SHIPPED | OUTPATIENT
Start: 2022-12-06 | End: 2022-12-06 | Stop reason: SDUPTHER

## 2022-12-06 RX ORDER — IBUPROFEN 600 MG/1
600 TABLET ORAL ONCE
Status: COMPLETED | OUTPATIENT
Start: 2022-12-06 | End: 2022-12-06

## 2022-12-06 RX ORDER — AMOXICILLIN 500 MG/1
500 TABLET, FILM COATED ORAL 3 TIMES DAILY
Qty: 21 TABLET | Refills: 0 | Status: SHIPPED | OUTPATIENT
Start: 2022-12-06 | End: 2023-01-03

## 2022-12-06 RX ADMIN — IBUPROFEN 600 MG: 600 TABLET, FILM COATED ORAL at 14:02

## 2022-12-06 ASSESSMENT — LIFESTYLE VARIABLES
ON A TYPICAL DAY WHEN YOU DRINK ALCOHOL HOW MANY DRINKS DO YOU HAVE: 0
HAVE PEOPLE ANNOYED YOU BY CRITICIZING YOUR DRINKING: NO
AVERAGE NUMBER OF DAYS PER WEEK YOU HAVE A DRINK CONTAINING ALCOHOL: 0
HAVE YOU EVER FELT YOU SHOULD CUT DOWN ON YOUR DRINKING: NO
DO YOU DRINK ALCOHOL: NO
TOTAL SCORE: 0
HOW MANY TIMES IN THE PAST YEAR HAVE YOU HAD 5 OR MORE DRINKS IN A DAY: 0
EVER HAD A DRINK FIRST THING IN THE MORNING TO STEADY YOUR NERVES TO GET RID OF A HANGOVER: NO
TOTAL SCORE: 0
EVER FELT BAD OR GUILTY ABOUT YOUR DRINKING: NO
CONSUMPTION TOTAL: NEGATIVE
TOTAL SCORE: 0

## 2022-12-06 NOTE — ED NOTES
Discharge teaching and paperwork provided regarding acute back pain and all questions/concerns answered. VSS,  assessment stable. Given information regarding home care and reasons to follow up with ED or primary MD. Patient provided norco and flexeril Rx. Patient discharged to the care of daughter and ambulated out of the ED.

## 2022-12-06 NOTE — DISCHARGE INSTRUCTIONS
Take ibuprofen 600 mg 3-4 times a day.  Add Flexeril for spasm and hydrocodone for more severe pain.  Follow-up with a physician if not improving in a week.  Return for weakness, fever, shortness of breath or leg swelling which would suggest a different cause of your pain.

## 2022-12-06 NOTE — ED PROVIDER NOTES
ED Provider Note    Scribed for Shahid Mireles M.D. by Zonia Higgins. 2022  12:24 PM    Primary care provider: Pcp Pt States None  Means of arrival: Walk In  History obtained from: Patient  History limited by: None    CHIEF COMPLAINT  Chief Complaint   Patient presents with    Back Pain     Patient reports upper R back pain that radiates down R arm    Flu Like Symptoms     Patient reports cough and congestion for one month. Patient reports brown mucus       HPI  Janina Lantigua is a 52 y.o. female who presents to the ED for right upper back pain onset yesterday after lifting but much worse this morning. She reports the pain is exacerbated by deep inhalation, moving her right arm, and walking. She has had difficulty walking secondary to the pain. Denies trauma or injury except for the reported lifting. She also reports intermittent abdominal pain that started her in the ED. She has had nasal congestion with facial pressure for the past 3.5 weeks, which is not improving. She reports her mucous used to be yellow, but is now green and brown. She denies fevers, sore throat, vomiting, diarrhea, dysuria, urinary urgency or frequency. She has a history of asthma and is a current smoker. She has not been using her inhalers because they are . She uses both albuterol and Advair. Denies recent sick contact. Is not vaccinated against COVID or Influenza.    REVIEW OF SYSTEMS  Pertinent positives include: right upper back pain,abdominal pain, nasal congestion, facial pressure.  Pertinent negatives include: fevers, sore throat, vomiting, diarrhea, dysuria, urinary urgency or frequency.    PAST MEDICAL HISTORY  Past Medical History:   Diagnosis Date    Asthma     Bipolar 2 disorder, major depressive episode (HCC)     Fibromyalgia 2010    Lumbago     Major depression 1996    Psychiatric disorder     bipolar    Psychiatric disorder     depression    PTSD (post-traumatic stress disorder)        SOCIAL HISTORY  Social  "History     Tobacco Use    Smoking status: Every Day     Packs/day: 0.50     Years: 30.00     Pack years: 15.00     Types: Cigarettes    Smokeless tobacco: Never   Vaping Use    Vaping Use: Never used   Substance Use Topics    Alcohol use: Never     Comment: 1-2 a month    Drug use: No       CURRENT MEDICATIONS  Home Medications       Reviewed by Katia Chowdary R.N. (Registered Nurse) on 12/06/22 at 1457  Med List Status: Partial     Medication Last Dose Status   cefdinir (OMNICEF) 300 MG Cap  Active   dicyclomine (BENTYL) 10 MG Cap  Active   ibuprofen (MOTRIN) 200 MG Tab  Active   ondansetron (ZOFRAN ODT) 4 MG TABLET DISPERSIBLE  Active   ondansetron (ZOFRAN ODT) 4 MG TABLET DISPERSIBLE  Active   oxyCODONE immediate-release (ROXICODONE) 5 MG Tab  Active   oxyCODONE-acetaminophen (PERCOCET) 5-325 MG Tab  Active                    ALLERGIES  Allergies   Allergen Reactions    Doxycycline Shortness of Breath     \"Chest pain\"    Latex Rash     States rash on hands with latex gloves       PHYSICAL EXAM  VITAL SIGNS: /72   Pulse 98   Temp 36.2 °C (97.1 °F) (Temporal)   Resp 16   Ht 1.702 m (5' 7\")   Wt 79.2 kg (174 lb 9.7 oz)   SpO2 98%   BMI 27.35 kg/m²  Reviewed and afebrile, normal oxygen room air  Constitutional :  Well developed, Well nourished, well appearing but in pain when she moves her torso.   HNT: atraumatic, wearing a mask.   Ears: External ears normal.  Eyes: pupils reactive without eye discharge nor conjunctival hyperemia.  Cardiovascular: Regular rhythm, No murmurs, No rubs, No gallops.  No dependent edema or calf tendernes.   Respiratory: No rales, rhonchi, wheeze, cough, right posterior para spinous tenderness which reproduces symptoms, pain with torso movement  Abdomen:  Soft, nontender  Skin: Warm, dry, no erythema, no rash.   Musculoskeletal: no limb deformities.    RADIOLOGY:  DX-CHEST-LIMITED (1 VIEW)   Final Result      Stable enlargement of the cardiomediastinal silhouette without " acute cardiopulmonary abnormality.          LABORATORY:  Results for orders placed or performed during the hospital encounter of 12/06/22   URINALYSIS    Specimen: Urine   Result Value Ref Range    Color Dark Yellow     Character Clear     Specific Gravity 1.020 <1.035    Ph 7.0 5.0 - 8.0    Glucose Negative Negative mg/dL    Ketones Trace (A) Negative mg/dL    Protein Negative Negative mg/dL    Bilirubin Small (A) Negative    Urobilinogen, Urine 0.2 Negative    Nitrite Negative Negative    Leukocyte Esterase Negative Negative    Occult Blood Negative Negative    Micro Urine Req see below         INTERVENTIONS:  Medications   ibuprofen (MOTRIN) tablet 600 mg (600 mg Oral Given 12/6/22 1402)       ED COURSE:  12:24 PM - Patient seen and examined at bedside. Patient will be treated with Motrin 600mg for her symptoms. Ordered DX-Chest, EKG, and UA to evaluate.     2:35 PM - Review of patient's radiology and lab results is unrevealing. No acute traumatic injury or signs of UTI.      2:47 PM - I reevaluated the patient at bedside and updated her on her results, as outlined above. Strict return precautions given for weakness, fever, shortness of breath or leg swelling. She will be prescribed Flexeril and Norco for pain management at home. I discussed the plan for discharge, as outlined below, and gave the patient the opportunity to ask questions. She understands and agrees to the plan for discharge.        MEDICAL DECISION MAKING:  This patient presents with upper back or posterior chest pain that seems to be musculoskeletal injury.  Despite a 3 week respiratory illness there is no pneumonia.  Her pain is high CVA area but there is no pyelonephritis.  She is low risk for PE by Perc criteria except for her age at 52 but given normal heart rate, respiratory rate, oxygenation, and reproducibilty of pain on exam PE is unlikely.  She probably has sinusitis give the duration and changes in nasal  congestion    DISPOSITION:home    PLAN:  Discharge Medication List as of 12/6/2022  2:38 PM        START taking these medications    Details   HYDROcodone-acetaminophen (NORCO) 5-325 MG Tab per tablet Take 1 Tablet by mouth every four hours as needed (mild pain) for up to 5 days., Disp-20 Tablet, R-0, Normal      cyclobenzaprine (FLEXERIL) 10 mg Tab Take 1 Tablet by mouth 3 times a day as needed for Mild Pain., Disp-30 Tablet, R-0, Normal           Amoxicillin  Acute back pain handout given  Return for weakness, fever, shortness of breath or leg swelling    [address] [address]  [phone]  Schedule an appointment as soon as possible for a visit in 1 week  As needed if not better    CONDITION:  Good.    FINAL IMPRESSION:  1. Pain, upper back    2. Acute bacterial sinusitis         I, Zonia Higgins (Scribe), am scribing for, and in the presence of, Shahid Mireles M.D..    Electronically signed by: Zonia Higgins (Scribe), 12/6/2022    Electronically signed by: Zonia Higgins, 12/6/2022    The note accurately reflects work and decisions made by me.  Shahid Mireles M.D.  12/7/2022  7:01 AM

## 2022-12-06 NOTE — ED TRIAGE NOTES
"Chief Complaint   Patient presents with    Back Pain     Patient reports upper R back pain that radiates down R arm    Flu Like Symptoms     Patient reports cough and congestion for one month. Patient reports brown mucus       51 yo female to triage for above complaint. Patient reports she has been sick for approx one month, states she now has brown mucus when coughing. Patient reports severe upper back pain that radiates down R arm.    Pt is alert and oriented, speaking in full sentences, follows commands and responds appropriately to questions.     Patient placed back in lobby and educated on triage process. Asked to inform RN of any changes.    /72   Pulse 98   Temp 36.2 °C (97.1 °F) (Temporal)   Resp 16   Ht 1.702 m (5' 7\")   Wt 79.2 kg (174 lb 9.7 oz)   SpO2 98%   BMI 27.35 kg/m²     "

## 2023-01-03 ENCOUNTER — OFFICE VISIT (OUTPATIENT)
Dept: CARDIOLOGY | Facility: MEDICAL CENTER | Age: 53
End: 2023-01-03
Payer: MEDICAID

## 2023-01-03 VITALS
SYSTOLIC BLOOD PRESSURE: 110 MMHG | RESPIRATION RATE: 16 BRPM | WEIGHT: 174 LBS | BODY MASS INDEX: 27.31 KG/M2 | HEART RATE: 72 BPM | HEIGHT: 67 IN | OXYGEN SATURATION: 100 % | DIASTOLIC BLOOD PRESSURE: 80 MMHG

## 2023-01-03 DIAGNOSIS — I51.7 ENLARGED HEART: ICD-10-CM

## 2023-01-03 DIAGNOSIS — Z95.0 CARDIAC PACEMAKER IN SITU: ICD-10-CM

## 2023-01-03 DIAGNOSIS — R00.1 BRADYCARDIA: ICD-10-CM

## 2023-01-03 DIAGNOSIS — Z72.0 TOBACCO ABUSE: ICD-10-CM

## 2023-01-03 PROCEDURE — 99406 BEHAV CHNG SMOKING 3-10 MIN: CPT | Performed by: INTERNAL MEDICINE

## 2023-01-03 PROCEDURE — 99204 OFFICE O/P NEW MOD 45 MIN: CPT | Mod: 25 | Performed by: INTERNAL MEDICINE

## 2023-01-03 RX ORDER — BENZONATATE 100 MG/1
CAPSULE ORAL
COMMUNITY
Start: 2022-12-22

## 2023-01-03 RX ORDER — BUDESONIDE AND FORMOTEROL FUMARATE DIHYDRATE 80; 4.5 UG/1; UG/1
AEROSOL RESPIRATORY (INHALATION)
COMMUNITY
Start: 2022-12-22

## 2023-01-03 RX ORDER — LORATADINE 10 MG
TABLET ORAL
COMMUNITY
Start: 2022-12-22

## 2023-01-03 RX ORDER — LEVOFLOXACIN 500 MG/1
TABLET, FILM COATED ORAL
COMMUNITY
Start: 2022-12-22 | End: 2023-01-03

## 2023-01-03 RX ORDER — ALBUTEROL SULFATE 90 UG/1
AEROSOL, METERED RESPIRATORY (INHALATION)
COMMUNITY
Start: 2022-12-22

## 2023-01-03 RX ORDER — CYCLOBENZAPRINE HCL 5 MG
TABLET ORAL
COMMUNITY
Start: 2022-12-22 | End: 2023-01-03

## 2023-01-03 RX ORDER — IBUPROFEN 600 MG/1
TABLET ORAL
COMMUNITY
Start: 2022-12-22

## 2023-01-03 NOTE — PROGRESS NOTES
CARDIOLOGY NEW PATIENT CONSULTATION    PCP: Pcp Pt States None    1. Bradycardia    2. Enlarged heart    3. Tobacco abuse    4. Cardiac pacemaker in situ        Janina Lantigua is overdue for ASCVD risk assessment I therefore ordered a laboratory profile.  It is unclear if the pacemaker is truly needed.  Referred her back to the device clinic for regular monitoring as well as programming the backup rate of the device down to the 40s-should symptomatic bradycardia recur then it could be programmed higher again.    I counseled her for 5 minutes about the need for smoking cessation.  We discussed the harms of tobacco smoke as well as potential alternatives.  She will consider but has no committed plan.    I reviewed the chest x-ray and do not believe there is any signs of cardiomegaly.  The echocardiogram from 2019 corroborates this assessment.    Follow up: 1 year    Chief Complaint   Patient presents with    Cardiomegaly       History: Janina Lantigua is a 52 y.o. female with history of questionably symptomatic bradycardia with pacemaker implantation, tobacco abuse and enlarged heart presenting for follow-up.  She reports longstanding bradycardia and has had this noted during various surgeries in the past.  In particular, when her eldest daughter was born she was bradycardic and she had an emergency .  In 2019 she was hospitalized at our center with feelings of weakness.  She was diagnosed with a urinary tract infection and bradycardic with heart rates consistently in the 30s and 40s-all sinus.  She eventually was treated with pacemaker.  She no longer has symptoms of bradycardia but does occasionally feel lightheaded when standing.    She smokes just under half pack cigarettes daily and believes the hand to mouth fixation is a big driving force.    She was recently assessed in the emergency room with upper respiratory infection.  A chest x-ray was reported with cardiomegaly though on my direct  "interpretation I find the cardiac silhouette within normal limits, particularly on an AP chest film.      ROS:   10 point review systems is otherwise negative except as per the HPI    PE:  /80 (BP Location: Left arm, Patient Position: Sitting, BP Cuff Size: Adult)   Pulse 72   Resp 16   Ht 1.702 m (5' 7\")   Wt 78.9 kg (174 lb)   SpO2 100%   BMI 27.25 kg/m²   Gen: no acute distress  HEENT: Symmetric face. Anicteric sclerae. Moist mucus membranes  NECK: No JVD. No lymphadenopathy  CARDIAC: Regular, Normal S1, S2, No murmur  VASCULATURE: carotids are normal bilaterally without bruit  RESP: Clear to auscultation bilaterally  ABD: Soft, non-tender, non-distended  EXT: No edema, no clubbing or cyanosis  SKIN: Warm and dry  NEURO: No gross deficits  PSYCH: Appropriate affect, participates in conversation    The ASCVD Risk score (Renee ESPINAL, et al., 2019) failed to calculate.    Past Medical History:   Diagnosis Date    Asthma     Bipolar 2 disorder, major depressive episode (HCC)     Fibromyalgia 2010    Lumbago     Major depression 1996    Psychiatric disorder     bipolar    Psychiatric disorder     depression    PTSD (post-traumatic stress disorder)      Past Surgical History:   Procedure Laterality Date    PACEMAKER INSERTION  08/23/2019    Placed for bradycardia    OTHER      ovarian cyst removed by laser surgery    PRIMARY C SECTION      3 times     Allergies   Allergen Reactions    Doxycycline Shortness of Breath     \"Chest pain\"    Latex Rash     States rash on hands with latex gloves     Outpatient Encounter Medications as of 1/3/2023   Medication Sig Dispense Refill    albuterol 108 (90 Base) MCG/ACT Aero Soln inhalation aerosol INHALE 1 PUFF BY MOUTH EVERY 4-6 HOURS AS NEEDED FOR SHORTNESS OF BREATH      benzonatate (TESSALON) 100 MG Cap TAKE 1-2 CAPSULES BY MOUTH THREE TIMES A DAY AS NEEDED FOR COUGH FOR 10 DAYS      budesonide-formoterol (SYMBICORT) 80-4.5 MCG/ACT Aerosol INHALE 2 PUFFS BY MOUTH " DAILY FOR ASTHMA RINSE MOUTH AFTER USE      ibuprofen (MOTRIN) 600 MG Tab TAKE ONE TABLET BY MOUTH THREE TIMES A DAY AS NEEDED WITH FOOD OR MILK FOR BACK PAIN      GAS RELIEF EXTRA STRENGTH 125 MG chewable tablet CHEW 2 TABLETS BY MOUTH TWICE A DAY AS NEEDED FOR ABDOMINAL BLOATING      cyclobenzaprine (FLEXERIL) 10 mg Tab Take 1 Tablet by mouth 3 times a day as needed for Mild Pain. 30 Tablet 0    dicyclomine (BENTYL) 10 MG Cap Take 1 Cap by mouth 4 Times a Day,Before Meals and at Bedtime. 60 Cap 0    ondansetron (ZOFRAN ODT) 4 MG TABLET DISPERSIBLE Take 1 Tab by mouth every 6 hours as needed for Nausea. 10 Tab 0    [DISCONTINUED] cyclobenzaprine (FLEXERIL) 5 mg tablet TAKE 1-2 TABLETS BY MOUTH DAILY AT BEDTIME AS NEEDED FOR BACK PAIN (Patient not taking: Reported on 1/3/2023)      [DISCONTINUED] levoFLOXacin (LEVAQUIN) 500 MG tablet TAKE ONE TABLET BY MOUTH DAILY FOR SINUSITIS FOR 7 DAYS (Patient not taking: Reported on 1/3/2023)      [DISCONTINUED] Amoxicillin 500 MG Tab Take 1 Tablet by mouth 3 times a day. (Patient not taking: Reported on 1/3/2023) 21 Tablet 0    [DISCONTINUED] ibuprofen (MOTRIN) 200 MG Tab Take 1,000 mg by mouth every 6 hours as needed (Pain). (Patient not taking: Reported on 1/3/2023)      [DISCONTINUED] oxyCODONE immediate-release (ROXICODONE) 5 MG Tab Take 5 mg by mouth every 8 hours as needed for Severe Pain. (Patient not taking: Reported on 1/3/2023)      [DISCONTINUED] ondansetron (ZOFRAN ODT) 4 MG TABLET DISPERSIBLE Take 4 mg by mouth every 8 hours as needed for Nausea. (Patient not taking: Reported on 1/3/2023)      [DISCONTINUED] oxyCODONE-acetaminophen (PERCOCET) 5-325 MG Tab Take 1 Tab by mouth every 8 hours as needed for Severe Pain. (Patient not taking: Reported on 1/3/2023)      [DISCONTINUED] cefdinir (OMNICEF) 300 MG Cap Take 1 Cap by mouth 2 times a day. (Patient not taking: Reported on 1/3/2023) 14 Cap 0     No facility-administered encounter medications on file as of  1/3/2023.     Social History     Socioeconomic History    Marital status: Single     Spouse name: Not on file    Number of children: Not on file    Years of education: Not on file    Highest education level: Not on file   Occupational History    Not on file   Tobacco Use    Smoking status: Every Day     Packs/day: 0.50     Years: 30.00     Pack years: 15.00     Types: Cigarettes    Smokeless tobacco: Never   Vaping Use    Vaping Use: Never used   Substance and Sexual Activity    Alcohol use: Yes     Comment: 1-2 a month    Drug use: No    Sexual activity: Not Currently     Partners: Male   Other Topics Concern    Not on file   Social History Narrative    Not on file     Social Determinants of Health     Financial Resource Strain: Not on file   Food Insecurity: Not on file   Transportation Needs: Not on file   Physical Activity: Not on file   Stress: Not on file   Social Connections: Not on file   Intimate Partner Violence: Not on file   Housing Stability: Not on file     Family History   Problem Relation Age of Onset    Cancer Mother 43        melanoma,  from cancer spread at age 48.     Bipolar disorder Father     Alzheimer's Disease Maternal Grandmother     Cancer Paternal Grandmother         breast cancer    Cancer Paternal Grandfather         lung cancer         Studies  Lab Results   Component Value Date/Time    CHOLSTRLTOT 112 2019 04:00 AM    LDL 65 2019 04:00 AM    HDL 32 (A) 2019 04:00 AM    TRIGLYCERIDE 73 2019 04:00 AM       Lab Results   Component Value Date/Time    SODIUM 144 2020 02:45 PM    POTASSIUM 3.8 2020 02:45 PM    CHLORIDE 105 2020 02:45 PM    CO2 27 2020 02:45 PM    GLUCOSE 85 2020 02:45 PM    BUN 8 2020 02:45 PM    CREATININE 0.92 2020 02:45 PM     Lab Results   Component Value Date/Time    ALKPHOSPHAT 72 2020 02:45 PM    ASTSGOT 18 2020 02:45 PM    ALTSGPT 14 2020 02:45 PM    TBILIRUBIN 0.3 2020  02:45 PM

## 2023-01-16 ENCOUNTER — NON-PROVIDER VISIT (OUTPATIENT)
Dept: CARDIOLOGY | Facility: MEDICAL CENTER | Age: 53
End: 2023-01-16
Payer: MEDICAID

## 2023-01-16 DIAGNOSIS — Z95.0 CARDIAC PACEMAKER IN SITU: ICD-10-CM

## 2023-01-16 DIAGNOSIS — I49.5 SICK SINUS SYNDROME (HCC): ICD-10-CM

## 2023-01-16 DIAGNOSIS — R00.1 BRADYCARDIA: ICD-10-CM

## 2023-01-16 PROCEDURE — 93280 PM DEVICE PROGR EVAL DUAL: CPT | Performed by: INTERNAL MEDICINE

## 2023-01-17 ENCOUNTER — NON-PROVIDER VISIT (OUTPATIENT)
Dept: CARDIOLOGY | Facility: MEDICAL CENTER | Age: 53
End: 2023-01-17
Payer: MEDICAID

## 2023-01-17 PROCEDURE — 93294 REM INTERROG EVL PM/LDLS PM: CPT | Performed by: INTERNAL MEDICINE

## 2023-01-17 NOTE — CARDIAC REMOTE MONITOR - SCAN
Device transmission reviewed. Device demonstrated appropriate function.       Electronically Signed by: Ramiro Whitlock M.D.    1/17/2023  10:07 AM

## 2023-04-18 ENCOUNTER — NON-PROVIDER VISIT (OUTPATIENT)
Dept: CARDIOLOGY | Facility: MEDICAL CENTER | Age: 53
End: 2023-04-18
Payer: MEDICAID

## 2023-04-18 PROCEDURE — 93294 REM INTERROG EVL PM/LDLS PM: CPT | Performed by: INTERNAL MEDICINE

## 2023-04-18 NOTE — CARDIAC REMOTE MONITOR - SCAN
Device transmission reviewed. Device demonstrated appropriate function.       Electronically Signed by: Ramiro Whitlock M.D.    4/19/2023  2:59 PM

## 2023-05-09 ENCOUNTER — HOSPITAL ENCOUNTER (EMERGENCY)
Facility: MEDICAL CENTER | Age: 53
End: 2023-05-09
Attending: EMERGENCY MEDICINE
Payer: MEDICAID

## 2023-05-09 ENCOUNTER — APPOINTMENT (OUTPATIENT)
Dept: RADIOLOGY | Facility: MEDICAL CENTER | Age: 53
End: 2023-05-09
Attending: EMERGENCY MEDICINE
Payer: MEDICAID

## 2023-05-09 VITALS
BODY MASS INDEX: 25.86 KG/M2 | SYSTOLIC BLOOD PRESSURE: 106 MMHG | RESPIRATION RATE: 16 BRPM | HEART RATE: 45 BPM | DIASTOLIC BLOOD PRESSURE: 62 MMHG | TEMPERATURE: 97 F | WEIGHT: 165.12 LBS | OXYGEN SATURATION: 97 %

## 2023-05-09 DIAGNOSIS — R10.32 LLQ ABDOMINAL PAIN: ICD-10-CM

## 2023-05-09 DIAGNOSIS — K42.9 UMBILICAL HERNIA WITHOUT OBSTRUCTION AND WITHOUT GANGRENE: ICD-10-CM

## 2023-05-09 LAB
ALBUMIN SERPL BCP-MCNC: 3.8 G/DL (ref 3.2–4.9)
ALBUMIN/GLOB SERPL: 1.2 G/DL
ALP SERPL-CCNC: 86 U/L (ref 30–99)
ALT SERPL-CCNC: 12 U/L (ref 2–50)
ANION GAP SERPL CALC-SCNC: 10 MMOL/L (ref 7–16)
APPEARANCE UR: CLEAR
AST SERPL-CCNC: 14 U/L (ref 12–45)
BASOPHILS # BLD AUTO: 0.7 % (ref 0–1.8)
BASOPHILS # BLD: 0.05 K/UL (ref 0–0.12)
BILIRUB SERPL-MCNC: 0.3 MG/DL (ref 0.1–1.5)
BILIRUB UR QL STRIP.AUTO: NEGATIVE
BUN SERPL-MCNC: 16 MG/DL (ref 8–22)
CALCIUM ALBUM COR SERPL-MCNC: 9.1 MG/DL (ref 8.5–10.5)
CALCIUM SERPL-MCNC: 8.9 MG/DL (ref 8.5–10.5)
CHLORIDE SERPL-SCNC: 106 MMOL/L (ref 96–112)
CO2 SERPL-SCNC: 26 MMOL/L (ref 20–33)
COLOR UR: YELLOW
CREAT SERPL-MCNC: 0.89 MG/DL (ref 0.5–1.4)
EOSINOPHIL # BLD AUTO: 0.12 K/UL (ref 0–0.51)
EOSINOPHIL NFR BLD: 1.8 % (ref 0–6.9)
ERYTHROCYTE [DISTWIDTH] IN BLOOD BY AUTOMATED COUNT: 45.9 FL (ref 35.9–50)
GFR SERPLBLD CREATININE-BSD FMLA CKD-EPI: 79 ML/MIN/1.73 M 2
GLOBULIN SER CALC-MCNC: 3.3 G/DL (ref 1.9–3.5)
GLUCOSE SERPL-MCNC: 96 MG/DL (ref 65–99)
GLUCOSE UR STRIP.AUTO-MCNC: NEGATIVE MG/DL
HCG SERPL QL: NEGATIVE
HCT VFR BLD AUTO: 45.2 % (ref 37–47)
HGB BLD-MCNC: 15 G/DL (ref 12–16)
IMM GRANULOCYTES # BLD AUTO: 0.03 K/UL (ref 0–0.11)
IMM GRANULOCYTES NFR BLD AUTO: 0.4 % (ref 0–0.9)
KETONES UR STRIP.AUTO-MCNC: NEGATIVE MG/DL
LEUKOCYTE ESTERASE UR QL STRIP.AUTO: NEGATIVE
LIPASE SERPL-CCNC: 31 U/L (ref 11–82)
LYMPHOCYTES # BLD AUTO: 2.66 K/UL (ref 1–4.8)
LYMPHOCYTES NFR BLD: 39.1 % (ref 22–41)
MCH RBC QN AUTO: 31.4 PG (ref 27–33)
MCHC RBC AUTO-ENTMCNC: 33.2 G/DL (ref 33.6–35)
MCV RBC AUTO: 94.6 FL (ref 81.4–97.8)
MICRO URNS: NORMAL
MONOCYTES # BLD AUTO: 0.55 K/UL (ref 0–0.85)
MONOCYTES NFR BLD AUTO: 8.1 % (ref 0–13.4)
NEUTROPHILS # BLD AUTO: 3.39 K/UL (ref 2–7.15)
NEUTROPHILS NFR BLD: 49.9 % (ref 44–72)
NITRITE UR QL STRIP.AUTO: NEGATIVE
NRBC # BLD AUTO: 0 K/UL
NRBC BLD-RTO: 0 /100 WBC
PH UR STRIP.AUTO: 6 [PH] (ref 5–8)
PLATELET # BLD AUTO: 281 K/UL (ref 164–446)
PMV BLD AUTO: 8.7 FL (ref 9–12.9)
POTASSIUM SERPL-SCNC: 4.4 MMOL/L (ref 3.6–5.5)
PROT SERPL-MCNC: 7.1 G/DL (ref 6–8.2)
PROT UR QL STRIP: NEGATIVE MG/DL
RBC # BLD AUTO: 4.78 M/UL (ref 4.2–5.4)
RBC UR QL AUTO: NEGATIVE
SODIUM SERPL-SCNC: 142 MMOL/L (ref 135–145)
SP GR UR STRIP.AUTO: 1.02
UROBILINOGEN UR STRIP.AUTO-MCNC: 0.2 MG/DL
WBC # BLD AUTO: 6.8 K/UL (ref 4.8–10.8)

## 2023-05-09 PROCEDURE — 36415 COLL VENOUS BLD VENIPUNCTURE: CPT

## 2023-05-09 PROCEDURE — 83690 ASSAY OF LIPASE: CPT

## 2023-05-09 PROCEDURE — 700117 HCHG RX CONTRAST REV CODE 255: Mod: UD | Performed by: EMERGENCY MEDICINE

## 2023-05-09 PROCEDURE — 700111 HCHG RX REV CODE 636 W/ 250 OVERRIDE (IP): Mod: UD | Performed by: EMERGENCY MEDICINE

## 2023-05-09 PROCEDURE — 81003 URINALYSIS AUTO W/O SCOPE: CPT

## 2023-05-09 PROCEDURE — 96374 THER/PROPH/DIAG INJ IV PUSH: CPT | Mod: XU

## 2023-05-09 PROCEDURE — 84703 CHORIONIC GONADOTROPIN ASSAY: CPT

## 2023-05-09 PROCEDURE — 96376 TX/PRO/DX INJ SAME DRUG ADON: CPT

## 2023-05-09 PROCEDURE — 80053 COMPREHEN METABOLIC PANEL: CPT

## 2023-05-09 PROCEDURE — 99284 EMERGENCY DEPT VISIT MOD MDM: CPT

## 2023-05-09 PROCEDURE — 96375 TX/PRO/DX INJ NEW DRUG ADDON: CPT

## 2023-05-09 PROCEDURE — 74177 CT ABD & PELVIS W/CONTRAST: CPT

## 2023-05-09 PROCEDURE — 85025 COMPLETE CBC W/AUTO DIFF WBC: CPT

## 2023-05-09 RX ORDER — ONDANSETRON 4 MG/1
4 TABLET, ORALLY DISINTEGRATING ORAL EVERY 8 HOURS PRN
Qty: 10 TABLET | Refills: 0 | Status: SHIPPED | OUTPATIENT
Start: 2023-05-09

## 2023-05-09 RX ORDER — ONDANSETRON 2 MG/ML
4 INJECTION INTRAMUSCULAR; INTRAVENOUS ONCE
Status: COMPLETED | OUTPATIENT
Start: 2023-05-09 | End: 2023-05-09

## 2023-05-09 RX ORDER — MORPHINE SULFATE 4 MG/ML
4 INJECTION INTRAVENOUS ONCE
Status: COMPLETED | OUTPATIENT
Start: 2023-05-09 | End: 2023-05-09

## 2023-05-09 RX ORDER — DICYCLOMINE HCL 20 MG
20 TABLET ORAL 4 TIMES DAILY PRN
Qty: 40 TABLET | Refills: 0 | Status: SHIPPED | OUTPATIENT
Start: 2023-05-09

## 2023-05-09 RX ORDER — METOCLOPRAMIDE HYDROCHLORIDE 5 MG/ML
10 INJECTION INTRAMUSCULAR; INTRAVENOUS ONCE
Status: COMPLETED | OUTPATIENT
Start: 2023-05-09 | End: 2023-05-09

## 2023-05-09 RX ADMIN — METOCLOPRAMIDE 10 MG: 5 INJECTION, SOLUTION INTRAMUSCULAR; INTRAVENOUS at 17:58

## 2023-05-09 RX ADMIN — MORPHINE SULFATE 4 MG: 4 INJECTION INTRAVENOUS at 16:30

## 2023-05-09 RX ADMIN — MORPHINE SULFATE 4 MG: 4 INJECTION INTRAVENOUS at 14:37

## 2023-05-09 RX ADMIN — ONDANSETRON 4 MG: 2 INJECTION INTRAMUSCULAR; INTRAVENOUS at 17:05

## 2023-05-09 RX ADMIN — IOHEXOL 100 ML: 350 INJECTION, SOLUTION INTRAVENOUS at 16:58

## 2023-05-09 RX ADMIN — ONDANSETRON 4 MG: 2 INJECTION INTRAMUSCULAR; INTRAVENOUS at 14:37

## 2023-05-09 ASSESSMENT — PAIN DESCRIPTION - PAIN TYPE
TYPE: ACUTE PAIN

## 2023-05-09 NOTE — ED NOTES
Pt. C/o pain increasing, discussed with MD.  New orders received, pt. Medicated per MAR.  Awaiting CT.      yes

## 2023-05-09 NOTE — ED PROVIDER NOTES
"  ER Provider Note    Scribed for Jarod Wells M.d. by Clare Contreras. 2023  2:00 PM    Primary Care Provider: Pcp Pt States None    CHIEF COMPLAINT  Chief Complaint   Patient presents with    Abdominal Pain     X 3 days. Cramping. L lower pelvic pain, lump noted to L pelvic region w/ pain.      EXTERNAL RECORDS REVIEWED  Outpatient Notes reviewed cardiology notes from January patient has a pacemaker for bradycardia    HPI/ROS      Janina Lantigua is a 52 y.o. female who presents to the ED complaining of abdominal pain onset 4 days ago.She had an episode of diarrhea onset 3 days ago. She states that she was \"screaming on the toilet in pain\". She has a history of GI issues. She has a history of diverticulitis. She has nausea, but denies and vomiting. She denies any black or bloody stools, fevers, dysuria, She has been taking Tylenol 1,000 mg of Tylenol every 6 hours for 4 days. She is allergic to doxycyline.   She has a surgical history of c-sections.      PAST MEDICAL HISTORY  Past Medical History:   Diagnosis Date    Asthma     Bipolar 2 disorder, major depressive episode (HCC)     Fibromyalgia     Lumbago     Major depression     Psychiatric disorder     bipolar    Psychiatric disorder     depression    PTSD (post-traumatic stress disorder)        SURGICAL HISTORY  Past Surgical History:   Procedure Laterality Date    PACEMAKER INSERTION  2019    Placed for bradycardia    OTHER      ovarian cyst removed by laser surgery    PRIMARY C SECTION      3 times       FAMILY HISTORY  Family History   Problem Relation Age of Onset    Cancer Mother 43        melanoma,  from cancer spread at age 48.     Bipolar disorder Father     Alzheimer's Disease Maternal Grandmother     Cancer Paternal Grandmother         breast cancer    Cancer Paternal Grandfather         lung cancer       SOCIAL HISTORY   reports that she has been smoking cigarettes. She has a 15.00 pack-year smoking history. She " has never used smokeless tobacco. She reports current alcohol use. She reports that she does not use drugs.    CURRENT MEDICATIONS  Discharge Medication List as of 5/9/2023  6:25 PM        CONTINUE these medications which have NOT CHANGED    Details   albuterol 108 (90 Base) MCG/ACT Aero Soln inhalation aerosol INHALE 1 PUFF BY MOUTH EVERY 4-6 HOURS AS NEEDED FOR SHORTNESS OF BREATH, Historical Med      benzonatate (TESSALON) 100 MG Cap TAKE 1-2 CAPSULES BY MOUTH THREE TIMES A DAY AS NEEDED FOR COUGH FOR 10 DAYS, Historical Med      budesonide-formoterol (SYMBICORT) 80-4.5 MCG/ACT Aerosol INHALE 2 PUFFS BY MOUTH DAILY FOR ASTHMA RINSE MOUTH AFTER USE, Historical Med      ibuprofen (MOTRIN) 600 MG Tab TAKE ONE TABLET BY MOUTH THREE TIMES A DAY AS NEEDED WITH FOOD OR MILK FOR BACK PAIN, Historical Med      GAS RELIEF EXTRA STRENGTH 125 MG chewable tablet CHEW 2 TABLETS BY MOUTH TWICE A DAY AS NEEDED FOR ABDOMINAL BLOATING, MISSAEL, Historical Med      cyclobenzaprine (FLEXERIL) 10 mg Tab Take 1 Tablet by mouth 3 times a day as needed for Mild Pain., Disp-30 Tablet, R-0, Normal      dicyclomine (BENTYL) 10 MG Cap Take 1 Cap by mouth 4 Times a Day,Before Meals and at Bedtime., Disp-60 Cap,R-0, Print Plain Paper      !! ondansetron (ZOFRAN ODT) 4 MG TABLET DISPERSIBLE Take 1 Tab by mouth every 6 hours as needed for Nausea., Disp-10 Tab,R-0, Print Plain Paper       !! - Potential duplicate medications found. Please discuss with provider.          ALLERGIES  Doxycycline and Latex    PHYSICAL EXAM  /77   Pulse 71   Temp 36.2 °C (97.2 °F) (Temporal)   Resp 18   Wt 74.9 kg (165 lb 2 oz)   SpO2 100%   BMI 25.86 kg/m²     Constitutional: Well developed, Well nourished, mild acute distress.   HENT: Normocephalic, Atraumatic.  Eyes: Conjunctiva normal, No discharge.   Cardiovascular: Normal heart rate, Normal rhythm, No murmurs, equal pulses.   Pulmonary: Normal breath sounds, No respiratory distress, No wheezing, No  rales, No rhonchi.  Abdomen:Soft, Abdomen is soft in the left lower quadrant, palpable lump 2 cm x 2cm that does not feel like a hernia, she has a large midline abdominal scar, tenderness in the right upper quadrant. No rebound, no guarding.   Musculoskeletal: No major deformities noted, No tenderness.   Skin: Warm, Dry, No erythema, No rash.   Neurologic: Alert & oriented x 3, Normal motor function,  No focal deficits noted.   Psychiatric: Affect normal, Judgment normal, Mood normal.        DIAGNOSTIC STUDIES    Labs:   Results for orders placed or performed during the hospital encounter of 05/09/23   CBC WITH DIFFERENTIAL   Result Value Ref Range    WBC 6.8 4.8 - 10.8 K/uL    RBC 4.78 4.20 - 5.40 M/uL    Hemoglobin 15.0 12.0 - 16.0 g/dL    Hematocrit 45.2 37.0 - 47.0 %    MCV 94.6 81.4 - 97.8 fL    MCH 31.4 27.0 - 33.0 pg    MCHC 33.2 (L) 33.6 - 35.0 g/dL    RDW 45.9 35.9 - 50.0 fL    Platelet Count 281 164 - 446 K/uL    MPV 8.7 (L) 9.0 - 12.9 fL    Neutrophils-Polys 49.90 44.00 - 72.00 %    Lymphocytes 39.10 22.00 - 41.00 %    Monocytes 8.10 0.00 - 13.40 %    Eosinophils 1.80 0.00 - 6.90 %    Basophils 0.70 0.00 - 1.80 %    Immature Granulocytes 0.40 0.00 - 0.90 %    Nucleated RBC 0.00 /100 WBC    Neutrophils (Absolute) 3.39 2.00 - 7.15 K/uL    Lymphs (Absolute) 2.66 1.00 - 4.80 K/uL    Monos (Absolute) 0.55 0.00 - 0.85 K/uL    Eos (Absolute) 0.12 0.00 - 0.51 K/uL    Baso (Absolute) 0.05 0.00 - 0.12 K/uL    Immature Granulocytes (abs) 0.03 0.00 - 0.11 K/uL    NRBC (Absolute) 0.00 K/uL   COMP METABOLIC PANEL   Result Value Ref Range    Sodium 142 135 - 145 mmol/L    Potassium 4.4 3.6 - 5.5 mmol/L    Chloride 106 96 - 112 mmol/L    Co2 26 20 - 33 mmol/L    Anion Gap 10.0 7.0 - 16.0    Glucose 96 65 - 99 mg/dL    Bun 16 8 - 22 mg/dL    Creatinine 0.89 0.50 - 1.40 mg/dL    Calcium 8.9 8.5 - 10.5 mg/dL    AST(SGOT) 14 12 - 45 U/L    ALT(SGPT) 12 2 - 50 U/L    Alkaline Phosphatase 86 30 - 99 U/L    Total Bilirubin 0.3  0.1 - 1.5 mg/dL    Albumin 3.8 3.2 - 4.9 g/dL    Total Protein 7.1 6.0 - 8.2 g/dL    Globulin 3.3 1.9 - 3.5 g/dL    A-G Ratio 1.2 g/dL   LIPASE   Result Value Ref Range    Lipase 31 11 - 82 U/L   HCG QUAL SERUM   Result Value Ref Range    Beta-Hcg Qualitative Serum Negative Negative   URINALYSIS,CULTURE IF INDICATED    Specimen: Urine   Result Value Ref Range    Color Yellow     Character Clear     Specific Gravity 1.016 <1.035    Ph 6.0 5.0 - 8.0    Glucose Negative Negative mg/dL    Ketones Negative Negative mg/dL    Protein Negative Negative mg/dL    Bilirubin Negative Negative    Urobilinogen, Urine 0.2 Negative    Nitrite Negative Negative    Leukocyte Esterase Negative Negative    Occult Blood Negative Negative    Micro Urine Req see below    CORRECTED CALCIUM   Result Value Ref Range    Correct Calcium 9.1 8.5 - 10.5 mg/dL   ESTIMATED GFR   Result Value Ref Range    GFR (CKD-EPI) 79 >60 mL/min/1.73 m 2      Radiology:   The attending emergency physician has independently interpreted the diagnostic imaging associated with this visit and am waiting the final reading from the radiologist.   Preliminary interpretation is a follows: CT of the abdomen pelvis I do not see any obvious mass or hernia in the left lower quadrant.  Radiologist interpretation:   CT-ABDOMEN-PELVIS WITH   Final Result      1.  No acute abnormality within the abdomen or pelvis      2.  Small ventral hernia adjacent to the umbilicus which is right paramedian and contains abdominal fat but no bowel          COURSE & MEDICAL DECISION MAKING     ED Observation Status? Yes; I am placing the patient in to an observation status due to a diagnostic uncertainty as well as therapeutic intensity. Patient placed in observation status at 2:24 PM, 5/9/2023.     Observation plan is as follows: Labs and imaging    Upon Reevaluation, the patient's condition has: Improved; and will be discharged.    Patient discharged from ED Observation status at 5:25 PM,  5/9/2023.    INITIAL ASSESSMENT, COURSE AND PLAN  Care Narrative:     2:01 PM - Patient seen and examined at bedside. The patient will be medicated with morphine 4 mg and Zofran 4 mg. Ordered for CBC w/ diff, CMP, lipase, HCG qual serum, and UA w/ culture if indicated to evaluate her symptoms.  Differential diagnoses include but not limited to: hernia, diverticulitis, or pelvic mass.    2:24 PM - Ordered for CT-abdomen/pelvis w/ to evaluate.     4:28 PM - Ordered for morphine 4 mg to treat.    5:00 PM - Ordered for Zofran 4 to treat.     5:50 PM - Ordered for Reglan 10 mg to treat.     PROBLEM LIST  Left lower quadrant abdominal pain at this point time exact cause of the patient's left lower quadrant abdominal pain is unclear to me.  There does not appear to be any obvious abnormality    DISPOSITION AND DISCUSSIONS  Escalation of care considered, and ultimately not performed: acute inpatient care management, however at this time, the patient is most appropriate for outpatient management.    Decision tools and prescription drugs considered including, but not limited to:  Antiemetics and Bentyl to help with abdominal cramping .    The patient will return for new or worsening symptoms and is stable at the time of discharge.    DISPOSITION:  Patient will be discharged home in stable condition.    FOLLOW UP:  Your doctor    Schedule an appointment as soon as possible for a visit in 1 week      [facility]  [address] [facility] [address]  [phone]    If you need a doctor    [facility]  [address] [facility] [address]  [phone]    If you need a doctor    Samantha Malone M.D.  [address] [place] [id number] [phone]      to discuss your hernia      OUTPATIENT MEDICATIONS:  Discharge Medication List as of 5/9/2023  6:25 PM        START taking these medications    Details   !! ondansetron (ZOFRAN ODT) 4 MG TABLET DISPERSIBLE Take 1 Tablet by mouth every  8 hours as needed for Nausea/Vomiting., Disp-10 Tablet, R-0, Normal      dicyclomine (BENTYL) 20 MG Tab Take 1 Tablet by mouth 4 times a day as needed (Abdominal cramping)., Disp-40 Tablet, R-0, Normal       !! - Potential duplicate medications found. Please discuss with provider.           FINAL DIANGOSIS  1. LLQ abdominal pain    2. Umbilical hernia without obstruction and without gangrene       IClare (Rock), am scribing for, and in the presence of, OMID Khan*.    Electronically signed by: Clare Contreras (Rock), 5/9/2023    IJarod M.* personally performed the services described in this documentation, as scribed by Clare Contreras in my presence, and it is both accurate and complete.      The note accurately reflects work and decisions made by me.  Jarod Wells M.D.  5/9/2023  9:19 PM

## 2023-05-09 NOTE — ED NOTES
Pt. Ambulated to bathroom with steady gait and urine sample has been sent.  Labs were completed while pt. Was in lobby.  Pt. Has spo2 and b/p monitors connected.  Family at bedside for support.

## 2023-05-09 NOTE — ED TRIAGE NOTES
Chief Complaint   Patient presents with    Abdominal Pain     X 3 days. Cramping. L lower pelvic pain, lump noted to L pelvic region w/ pain.         /71   Pulse 87   Temp 36.2 °C (97.2 °F) (Temporal)   Resp 18   SpO2 97%

## 2023-05-10 NOTE — ED NOTES
Pt. Reports pain is somewhat better now but c/o 10/10 nausea.  Discussed with ERP, new orders received and pt. Medicated per MAR.

## 2023-05-10 NOTE — DISCHARGE INSTRUCTIONS
Return the emergency department if you have increasing pain, fever, severe vomiting, blood in vomit or blood in stool.

## 2023-07-18 ENCOUNTER — NON-PROVIDER VISIT (OUTPATIENT)
Dept: CARDIOLOGY | Facility: MEDICAL CENTER | Age: 53
End: 2023-07-18
Payer: MEDICAID

## 2023-07-18 PROCEDURE — 93294 REM INTERROG EVL PM/LDLS PM: CPT | Performed by: INTERNAL MEDICINE

## 2023-07-18 NOTE — CARDIAC REMOTE MONITOR - SCAN
Device transmission reviewed. Device demonstrated appropriate function.       Electronically Signed by: Ramiro Whitlock M.D.    7/20/2023  2:57 PM

## 2023-10-17 ENCOUNTER — NON-PROVIDER VISIT (OUTPATIENT)
Dept: CARDIOLOGY | Facility: MEDICAL CENTER | Age: 53
End: 2023-10-17
Payer: MEDICAID

## 2023-10-17 PROCEDURE — 93294 REM INTERROG EVL PM/LDLS PM: CPT | Performed by: INTERNAL MEDICINE

## 2023-10-17 NOTE — CARDIAC REMOTE MONITOR - SCAN
Device transmission reviewed. Device demonstrated appropriate function.       Electronically Signed by: Lazarus Schultz M.D.    11/9/2023  8:05 AM

## 2024-01-16 ENCOUNTER — NON-PROVIDER VISIT (OUTPATIENT)
Dept: CARDIOLOGY | Facility: MEDICAL CENTER | Age: 54
End: 2024-01-16
Payer: MEDICAID

## 2024-01-16 PROCEDURE — 93294 REM INTERROG EVL PM/LDLS PM: CPT | Performed by: INTERNAL MEDICINE

## 2024-01-16 NOTE — CARDIAC REMOTE MONITOR - SCAN
Device transmission reviewed. Device demonstrated appropriate function.       Electronically Signed by: Ramiro Whitlock M.D.    1/16/2024  11:58 AM

## 2024-04-16 ENCOUNTER — NON-PROVIDER VISIT (OUTPATIENT)
Dept: CARDIOLOGY | Facility: MEDICAL CENTER | Age: 54
End: 2024-04-16
Payer: MEDICAID

## 2024-04-17 PROCEDURE — 93294 REM INTERROG EVL PM/LDLS PM: CPT | Performed by: INTERNAL MEDICINE

## 2024-07-16 ENCOUNTER — NON-PROVIDER VISIT (OUTPATIENT)
Dept: CARDIOLOGY | Facility: MEDICAL CENTER | Age: 54
End: 2024-07-16
Payer: MEDICAID

## 2024-07-16 PROCEDURE — 93294 REM INTERROG EVL PM/LDLS PM: CPT | Performed by: INTERNAL MEDICINE

## 2024-10-16 ENCOUNTER — NON-PROVIDER VISIT (OUTPATIENT)
Dept: CARDIOLOGY | Facility: MEDICAL CENTER | Age: 54
End: 2024-10-16
Payer: MEDICAID

## 2024-10-16 PROCEDURE — 93294 REM INTERROG EVL PM/LDLS PM: CPT | Performed by: INTERNAL MEDICINE

## 2025-01-15 ENCOUNTER — NON-PROVIDER VISIT (OUTPATIENT)
Dept: CARDIOLOGY | Facility: MEDICAL CENTER | Age: 55
End: 2025-01-15
Payer: MEDICAID

## 2025-01-15 PROCEDURE — 93294 REM INTERROG EVL PM/LDLS PM: CPT | Performed by: INTERNAL MEDICINE

## 2025-01-16 NOTE — CARDIAC REMOTE MONITOR - SCAN
Device transmission reviewed. Device demonstrated appropriate function.       Electronically Signed by: Lazarus Schultz M.D.    1/24/2025  12:01 PM

## 2025-04-17 ENCOUNTER — NON-PROVIDER VISIT (OUTPATIENT)
Dept: CARDIOLOGY | Facility: MEDICAL CENTER | Age: 55
End: 2025-04-17
Payer: MEDICAID

## 2025-04-17 PROCEDURE — 93294 REM INTERROG EVL PM/LDLS PM: CPT | Performed by: INTERNAL MEDICINE

## 2025-04-17 NOTE — CARDIAC REMOTE MONITOR - SCAN
Device transmission reviewed. Device demonstrated appropriate function.       Electronically Signed by: Lazarus Schultz M.D.    4/20/2025  7:23 AM

## 2025-07-18 ENCOUNTER — NON-PROVIDER VISIT (OUTPATIENT)
Dept: CARDIOLOGY | Facility: MEDICAL CENTER | Age: 55
End: 2025-07-18
Payer: MEDICAID

## 2025-07-18 PROCEDURE — 93294 REM INTERROG EVL PM/LDLS PM: CPT | Performed by: INTERNAL MEDICINE
